# Patient Record
Sex: MALE | Race: WHITE | NOT HISPANIC OR LATINO | Employment: OTHER | URBAN - METROPOLITAN AREA
[De-identification: names, ages, dates, MRNs, and addresses within clinical notes are randomized per-mention and may not be internally consistent; named-entity substitution may affect disease eponyms.]

---

## 2022-01-18 ENCOUNTER — ESTABLISHED COMPREHENSIVE EXAM (OUTPATIENT)
Dept: URBAN - METROPOLITAN AREA CLINIC 76 | Facility: CLINIC | Age: 67
End: 2022-01-18

## 2022-01-18 DIAGNOSIS — H00.013: ICD-10-CM

## 2022-01-18 DIAGNOSIS — H43.393: ICD-10-CM

## 2022-01-18 DIAGNOSIS — E11.9: ICD-10-CM

## 2022-01-18 DIAGNOSIS — H25.813: ICD-10-CM

## 2022-01-18 LAB
LEFT EYE DIABETIC RETINOPATHY: NORMAL
RIGHT EYE DIABETIC RETINOPATHY: NORMAL

## 2022-01-18 PROCEDURE — 92014 COMPRE OPH EXAM EST PT 1/>: CPT

## 2022-01-18 PROCEDURE — 92202 OPSCPY EXTND ON/MAC DRAW: CPT

## 2022-01-18 ASSESSMENT — KERATOMETRY
OD_AXISANGLE2_DEGREES: 132
OD_K1POWER_DIOPTERS: 44.25
OS_K1POWER_DIOPTERS: 43.50
OD_AXISANGLE_DEGREES: 42
OD_K2POWER_DIOPTERS: 44.50
OS_AXISANGLE_DEGREES: 66
OS_AXISANGLE2_DEGREES: 156
OS_K2POWER_DIOPTERS: 44.50

## 2022-01-18 ASSESSMENT — TONOMETRY
OS_IOP_MMHG: 14
OD_IOP_MMHG: 14

## 2022-01-18 ASSESSMENT — VISUAL ACUITY
OD_SC: 20/25
OU_SC: 20/20
OS_SC: 20/20

## 2024-01-30 LAB — HBA1C MFR BLD HPLC: 5.8 %

## 2024-07-22 ENCOUNTER — OFFICE VISIT (OUTPATIENT)
Dept: FAMILY MEDICINE CLINIC | Facility: CLINIC | Age: 69
End: 2024-07-22
Payer: COMMERCIAL

## 2024-07-22 VITALS
TEMPERATURE: 98.5 F | HEIGHT: 69 IN | SYSTOLIC BLOOD PRESSURE: 134 MMHG | DIASTOLIC BLOOD PRESSURE: 82 MMHG | WEIGHT: 184 LBS | BODY MASS INDEX: 27.25 KG/M2 | OXYGEN SATURATION: 94 % | HEART RATE: 89 BPM

## 2024-07-22 DIAGNOSIS — Z79.899 MEDICATION MANAGEMENT: ICD-10-CM

## 2024-07-22 DIAGNOSIS — E11.9 TYPE 2 DIABETES MELLITUS WITHOUT COMPLICATION, WITHOUT LONG-TERM CURRENT USE OF INSULIN (HCC): ICD-10-CM

## 2024-07-22 DIAGNOSIS — Z12.11 SCREEN FOR COLON CANCER: ICD-10-CM

## 2024-07-22 DIAGNOSIS — Z76.89 ENCOUNTER TO ESTABLISH CARE WITH NEW DOCTOR: Primary | ICD-10-CM

## 2024-07-22 DIAGNOSIS — Z79.899 ENCOUNTER FOR LONG-TERM (CURRENT) USE OF MEDICATIONS: ICD-10-CM

## 2024-07-22 PROCEDURE — 99204 OFFICE O/P NEW MOD 45 MIN: CPT | Performed by: FAMILY MEDICINE

## 2024-07-22 RX ORDER — PANTOPRAZOLE SODIUM 40 MG/1
40 TABLET, DELAYED RELEASE ORAL DAILY
COMMUNITY
Start: 2024-07-05

## 2024-07-22 RX ORDER — ATORVASTATIN CALCIUM 10 MG/1
10 TABLET, FILM COATED ORAL
COMMUNITY
Start: 2024-07-07

## 2024-07-22 RX ORDER — LISINOPRIL 5 MG/1
5 TABLET ORAL DAILY
COMMUNITY
Start: 2024-07-10

## 2024-07-22 RX ORDER — METOPROLOL SUCCINATE 25 MG/1
25 TABLET, EXTENDED RELEASE ORAL DAILY
COMMUNITY
Start: 2024-05-02

## 2024-07-22 RX ORDER — DAPAGLIFLOZIN 5 MG/1
5 TABLET, FILM COATED ORAL DAILY
COMMUNITY
Start: 2024-07-08

## 2024-07-22 RX ORDER — BUPRENORPHINE HYDROCHLORIDE AND NALOXONE HYDROCHLORIDE DIHYDRATE 8; 2 MG/1; MG/1
1 TABLET SUBLINGUAL 2 TIMES DAILY
COMMUNITY

## 2024-07-22 NOTE — ASSESSMENT & PLAN NOTE
"  No results found for: \"HGBA1C\"    Orders:    Albumin / creatinine urine ratio; Future    Comprehensive metabolic panel; Future    Hemoglobin A1C; Future    "

## 2024-07-22 NOTE — PROGRESS NOTES
"Ambulatory Visit  Name: Bill Alamo      : 1955      MRN: [id number]  Encounter Provider: MO Borrego DO  Encounter Date: 2024   Encounter department: [facility] [place]    Assessment & Plan  Encounter to establish care with new doctor         Encounter for long-term (current) use of medications         Screen for colon cancer         Medication management         Type 2 diabetes mellitus without complication, without long-term current use of insulin (HCC)    No results found for: \"HGBA1C\"    Orders:  •  Albumin / creatinine urine ratio; Future  •  Comprehensive metabolic panel; Future  •  Hemoglobin A1C; Future    1. Encounter to establish care with new doctor  Comments:  Previously seeing  in [place]--0see labs on   2. Encounter for long-term (current) use of medications  Comments:  All medications reviewed for safety efficacy and tolerance  3. Screen for colon cancer  Comments:  Never had colonoscopy--ordered today  4. Medication management  Comments:  All medications reviewed for safety efficacy and tolerance  5. Type 2 diabetes mellitus without complication, without long-term current use of insulin (HCC)  Comments:  Last A1c 6 months ago 5.8  Repeat prior to next visit in 3 months  Orders:  -     Albumin / creatinine urine ratio; Future; Expected date: 10/22/2024  -     Comprehensive metabolic panel; Future; Expected date: 10/22/2024  -     Hemoglobin A1C; Future; Expected date: 10/22/2024      Depression Screening and Follow-up Plan: Patient was screened for depression during today's encounter. They screened negative with a PHQ-2 score of 1.      History of Present Illness     History of Present Illness  The patient is a 69-year-old male who is here to establish a new patient visit and I am glad to see him. He comes referred from Mr. Juliano Herrera, a long-time patient of mine and his wife do.    The patient's diabetes is well-managed with metformin 1000 mg twice daily and " Farxiga 5 mg. He was diagnosed with diabetes on 08/30/2018, with an initial A1c level of 10.6. Subsequently, he was diagnosed with diabetes and hepatitis on 08/30/2018. Since initiating medication, his A1c has consistently remained under 6. He underwent a digital prostate examination on 01/03/2023, which yielded normal results. He has never undergone a colonoscopy.    The patient experiences rashes on the posterior aspect of his legs, which he initially attributed to stasis ulcers. The rash was previously associated with pruritus, but this has since resolved.  • He was a  for a [place] firm for many years. He retired at 63. He moved to [place] from [place] to help his son move. He  in 2015. He has 2 sons. He has never smoked. He drinks alcohol occasionally. He has never used marijuana. He was addicted to opioids. He used his own resources to get when he needed. He used the Suboxone doctor for a while. He moved here from the clinic in [facility] where he is at. He is not on methadone. He is on buprenorphine. He sometimes forgets to take it. He did have hepatitis C that was resolved in 04/2019 after taking Mavyret 1 pill a day for 8 weeks.     Review of Systems   Constitutional:  Negative for chills and fever.   HENT:  Negative for ear pain and sore throat.    Eyes:  Negative for pain and visual disturbance.   Respiratory:  Negative for cough and shortness of breath.    Cardiovascular:  Negative for chest pain and palpitations.   Gastrointestinal:  Negative for abdominal pain and vomiting.   Endocrine:        Dx with DM in Aug, 2018:  initial A1c was 10.6  - this was the 1st one ever.    Genitourinary:  Negative for dysuria and hematuria.        Last EVONNE was Jan 30 by Dr. Lay in [place], [place]   Musculoskeletal:  Negative for arthralgias and back pain.   Skin:  Negative for color change and rash.   Neurological:  Negative for seizures and syncope.   All other systems  "reviewed and are negative.    Objective     /82 (BP Location: Left arm, Patient Position: Sitting, Cuff Size: Standard)   Pulse 89   Temp 98.5 °F (36.9 °C) (Temporal)   Ht 5' 9\" (1.753 m)   Wt 83.5 kg (184 lb)   SpO2 94%   BMI 27.17 kg/m²     Physical Exam    Physical Exam  Vitals and nursing note reviewed.   Constitutional:       General: He is not in acute distress.     Appearance: Normal appearance. He is well-developed. He is not ill-appearing, toxic-appearing or diaphoretic.   HENT:      Head: Normocephalic and atraumatic.      Nose: Nose normal.      Mouth/Throat:      Mouth: Mucous membranes are moist.   Eyes:      Extraocular Movements: Extraocular movements intact.      Conjunctiva/sclera: Conjunctivae normal.      Pupils: Pupils are equal, round, and reactive to light.   Cardiovascular:      Rate and Rhythm: Normal rate and regular rhythm.      Pulses: Normal pulses.      Heart sounds: Normal heart sounds. No murmur heard.     No friction rub.   Pulmonary:      Effort: Pulmonary effort is normal. No respiratory distress.      Breath sounds: Normal breath sounds. No stridor. No wheezing or rhonchi.   Abdominal:      Palpations: Abdomen is soft.      Tenderness: There is no abdominal tenderness.   Genitourinary:     Comments: EVONNE done by Dr. Lay on Jan 31, 2024,     Impotent since 2010. Couldn't tolerate the Viagra/Cialis, so just accepts it.   Musculoskeletal:         General: No swelling.      Cervical back: Neck supple.   Skin:     General: Skin is warm and dry.      Coloration: Skin is not jaundiced or pale.      Findings: No erythema or rash.   Neurological:      General: No focal deficit present.      Mental Status: He is alert and oriented to person, place, and time. Mental status is at baseline.   Psychiatric:         Mood and Affect: Mood normal.         Behavior: Behavior normal.         Thought Content: Thought content normal.         Judgment: Judgment normal.       "

## 2024-08-13 DIAGNOSIS — Z79.899 ENCOUNTER FOR LONG-TERM (CURRENT) USE OF MEDICATIONS: Primary | ICD-10-CM

## 2024-08-13 RX ORDER — METOPROLOL SUCCINATE 25 MG/1
25 TABLET, EXTENDED RELEASE ORAL DAILY
Qty: 90 TABLET | Refills: 3 | Status: SHIPPED | OUTPATIENT
Start: 2024-08-13

## 2024-08-13 NOTE — TELEPHONE ENCOUNTER
Reason for call:   [x] Refill   [] Prior Auth  [] Other:     Office:   [x] PCP/Provider - [facility]   [] Specialty/Provider -     Medication: metoprolol succinate (TOPROL-XL) 25 mg 24 hr tablet     Dose/Frequency: 25 mg, Daily     Quantity: 100    Pharmacy: [id number]    Does the patient have enough for 3 days?   [] Yes   [x] No - Send as HP to POD

## 2024-10-07 DIAGNOSIS — E78.2 MIXED HYPERLIPIDEMIA: ICD-10-CM

## 2024-10-07 DIAGNOSIS — E11.9 TYPE 2 DIABETES MELLITUS WITHOUT COMPLICATION, WITHOUT LONG-TERM CURRENT USE OF INSULIN (HCC): Primary | ICD-10-CM

## 2024-10-08 RX ORDER — ATORVASTATIN CALCIUM 10 MG/1
10 TABLET, FILM COATED ORAL
Qty: 100 TABLET | Refills: 1 | Status: SHIPPED | OUTPATIENT
Start: 2024-10-08

## 2024-10-11 NOTE — TELEPHONE ENCOUNTER
Pharmacy states that they did not receive refill.    Reason for call:   [x] Refill   [] Prior Auth  [] Other:     Office:   [x] PCP/Provider - PRIMARY CARE [facility] POD  Authorized By: MO Borrego DO  [] Specialty/Provider -     Medication: metFORMIN (GLUCOPHAGE) 1000 MG tablet     Dose/Frequency: Take 1 tablet (1,000 mg total) by mouth 2 (two) times a day with meals     Quantity:  100 tablet     Pharmacy: [facility]/pharmacy #[id number] [facility] [address]     Does the patient have enough for 3 days?   [x] Yes   [] No - Send as HP to POD

## 2024-11-04 DIAGNOSIS — I10 PRIMARY HYPERTENSION: Primary | ICD-10-CM

## 2024-11-04 RX ORDER — LISINOPRIL 5 MG/1
5 TABLET ORAL DAILY
Qty: 90 TABLET | Refills: 1 | Status: SHIPPED | OUTPATIENT
Start: 2024-11-04

## 2024-11-04 NOTE — TELEPHONE ENCOUNTER
Reason for call:   [x] Refill   [] Prior Auth  [] Other:     Office:   [x] PCP/Provider - MO Borrego DO   [] Specialty/Provider -     Medication:     lisinopril (ZESTRIL) 5 mg tablet 5 mg, Daily       Pharmacy: [facility]/pharmacy #[id number] [address]      Does the patient have enough for 3 days?   [] Yes   [x] No - Send as HP to POD

## 2024-11-20 ENCOUNTER — APPOINTMENT (OUTPATIENT)
Dept: LAB | Facility: HOSPITAL | Age: 69
End: 2024-11-20
Payer: COMMERCIAL

## 2024-11-20 DIAGNOSIS — E11.9 TYPE 2 DIABETES MELLITUS WITHOUT COMPLICATION, WITHOUT LONG-TERM CURRENT USE OF INSULIN (HCC): ICD-10-CM

## 2024-11-20 LAB
ALBUMIN SERPL BCG-MCNC: 4.5 G/DL (ref 3.5–5)
ALP SERPL-CCNC: 44 U/L (ref 34–104)
ALT SERPL W P-5'-P-CCNC: 17 U/L (ref 7–52)
ANION GAP SERPL CALCULATED.3IONS-SCNC: 8 MMOL/L (ref 4–13)
AST SERPL W P-5'-P-CCNC: 18 U/L (ref 13–39)
BILIRUB SERPL-MCNC: 0.51 MG/DL (ref 0.2–1)
BUN SERPL-MCNC: 19 MG/DL (ref 5–25)
CALCIUM SERPL-MCNC: 9.5 MG/DL (ref 8.4–10.2)
CHLORIDE SERPL-SCNC: 99 MMOL/L (ref 96–108)
CO2 SERPL-SCNC: 28 MMOL/L (ref 21–32)
CREAT SERPL-MCNC: 0.83 MG/DL (ref 0.6–1.3)
CREAT UR-MCNC: 59.2 MG/DL
EST. AVERAGE GLUCOSE BLD GHB EST-MCNC: 131 MG/DL
GFR SERPL CREATININE-BSD FRML MDRD: 90 ML/MIN/1.73SQ M
GLUCOSE P FAST SERPL-MCNC: 121 MG/DL (ref 65–99)
HBA1C MFR BLD: 6.2 %
MICROALBUMIN UR-MCNC: <7 MG/L
POTASSIUM SERPL-SCNC: 3.7 MMOL/L (ref 3.5–5.3)
PROT SERPL-MCNC: 7.2 G/DL (ref 6.4–8.4)
SODIUM SERPL-SCNC: 135 MMOL/L (ref 135–147)

## 2024-11-20 PROCEDURE — 83036 HEMOGLOBIN GLYCOSYLATED A1C: CPT

## 2024-11-20 PROCEDURE — 82570 ASSAY OF URINE CREATININE: CPT

## 2024-11-20 PROCEDURE — 82043 UR ALBUMIN QUANTITATIVE: CPT

## 2024-11-20 PROCEDURE — 36415 COLL VENOUS BLD VENIPUNCTURE: CPT

## 2024-11-20 PROCEDURE — 80053 COMPREHEN METABOLIC PANEL: CPT

## 2024-11-21 ENCOUNTER — OFFICE VISIT (OUTPATIENT)
Dept: FAMILY MEDICINE CLINIC | Facility: CLINIC | Age: 69
End: 2024-11-21
Payer: COMMERCIAL

## 2024-11-21 VITALS
TEMPERATURE: 98.3 F | SYSTOLIC BLOOD PRESSURE: 132 MMHG | HEART RATE: 81 BPM | WEIGHT: 187.2 LBS | OXYGEN SATURATION: 95 % | DIASTOLIC BLOOD PRESSURE: 78 MMHG | BODY MASS INDEX: 27.73 KG/M2 | HEIGHT: 69 IN

## 2024-11-21 DIAGNOSIS — E78.2 MIXED HYPERLIPIDEMIA: ICD-10-CM

## 2024-11-21 DIAGNOSIS — L30.9 DERMATITIS: ICD-10-CM

## 2024-11-21 DIAGNOSIS — Z79.899 MEDICATION MANAGEMENT: ICD-10-CM

## 2024-11-21 DIAGNOSIS — Z12.11 SCREEN FOR COLON CANCER: ICD-10-CM

## 2024-11-21 DIAGNOSIS — E53.8 VITAMIN B12 DEFICIENCY: ICD-10-CM

## 2024-11-21 DIAGNOSIS — Z11.59 NEED FOR HEPATITIS C SCREENING TEST: ICD-10-CM

## 2024-11-21 DIAGNOSIS — Z71.2 ENCOUNTER TO DISCUSS TEST RESULTS: ICD-10-CM

## 2024-11-21 DIAGNOSIS — Z79.899 ENCOUNTER FOR LONG-TERM (CURRENT) USE OF MEDICATIONS: ICD-10-CM

## 2024-11-21 DIAGNOSIS — E11.9 TYPE 2 DIABETES MELLITUS WITHOUT COMPLICATION, WITHOUT LONG-TERM CURRENT USE OF INSULIN (HCC): ICD-10-CM

## 2024-11-21 DIAGNOSIS — I10 PRIMARY HYPERTENSION: Primary | ICD-10-CM

## 2024-11-21 DIAGNOSIS — Z00.00 ENCOUNTER FOR MEDICARE ANNUAL WELLNESS EXAM: ICD-10-CM

## 2024-11-21 DIAGNOSIS — E55.9 VITAMIN D DEFICIENCY: ICD-10-CM

## 2024-11-21 PROCEDURE — G0438 PPPS, INITIAL VISIT: HCPCS | Performed by: FAMILY MEDICINE

## 2024-11-21 PROCEDURE — 99214 OFFICE O/P EST MOD 30 MIN: CPT | Performed by: FAMILY MEDICINE

## 2024-11-21 RX ORDER — TRIAMCINOLONE ACETONIDE 1 MG/G
CREAM TOPICAL 2 TIMES DAILY
Qty: 30 G | Refills: 0 | Status: SHIPPED | OUTPATIENT
Start: 2024-11-21

## 2024-11-21 NOTE — PROGRESS NOTES
Name: Bill Alamo      : 1955      MRN: [id number]  Encounter Provider: MO Borrego DO  Encounter Date: 2024   Encounter department: [facility]    Assessment & Plan  Type 2 diabetes mellitus without complication, without long-term current use of insulin (HCC)    Lab Results   Component Value Date    HGBA1C 6.2 (H) 2024       Orders:    IRIS Diabetic eye exam    Albumin / creatinine urine ratio; Future    Comprehensive metabolic panel; Future    Hemoglobin A1C; Future    CBC and differential; Future    TSH, 3rd generation with Free T4 reflex; Future    Lipid Panel with Direct LDL reflex; Future    UA w Reflex to Microscopic w Reflex to Culture; Future    Primary hypertension    Orders:    Comprehensive metabolic panel; Future    Mixed hyperlipidemia    Orders:    Lipid Panel with Direct LDL reflex; Future    Encounter for long-term (current) use of medications    Orders:    Albumin / creatinine urine ratio; Future    Comprehensive metabolic panel; Future    Hemoglobin A1C; Future    CBC and differential; Future    TSH, 3rd generation with Free T4 reflex; Future    Lipid Panel with Direct LDL reflex; Future    Medication management         Encounter to discuss test results         Dermatitis         Vitamin B12 deficiency    Orders:    Vitamin B12; Future    Vitamin D deficiency    Orders:    Vitamin D 25 hydroxy; Future    Need for hepatitis C screening test    Orders:    Hepatitis C Antibody; Future    Screen for colon cancer    Orders:    Cologuard       Preventive health issues were discussed with patient, and age appropriate screening tests were ordered as noted in patient's After Visit Summary. Personalized health advice and appropriate referrals for health education or preventive services given if needed, as noted in patient's After Visit Summary.    History of Present Illness     Rash       Patient Care Team:  MO Borrego DO as PCP - General (Family  Medicine)    Review of Systems   Skin:  Positive for rash.     Medical History Reviewed by provider this encounter:       Annual Wellness Visit Questionnaire   Bill is here for his Initial Wellness visit.     Health Risk Assessment:   Patient rates overall health as very good. Patient feels that their physical health rating is slightly worse. Patient is satisfied with their life. Eyesight was rated as same. Hearing was rated as same. Patient feels that their emotional and mental health rating is same. Patients states they are never, rarely angry. Patient states they are sometimes unusually tired/fatigued. Pain experienced in the last 7 days has been none. Patient states that he has experienced no weight loss or gain in last 6 months. R ankle achilles tendon -- intermittent pain     Depression Screening:   PHQ-2 Score: 0      Fall Risk Screening:   In the past year, patient has experienced: no history of falling in past year      Home Safety:  Patient does not have trouble with stairs inside or outside of their home. Patient has working smoke alarms and has working carbon monoxide detector. Home safety hazards include: none.     Nutrition:   Current diet is Regular, Limited junk food and Other (please comment). Pt reports very good protein intake. Limited vegetables but does enjoy fruits. Too many carbs     Medications:   Patient is currently taking over-the-counter supplements. OTC medications include: see medication list. Patient is able to manage medications.     Activities of Daily Living (ADLs)/Instrumental Activities of Daily Living (IADLs):   Walk and transfer into and out of bed and chair?: Yes  Dress and groom yourself?: Yes    Bathe or shower yourself?: Yes    Feed yourself? Yes  Do your laundry/housekeeping?: Yes  Manage your money, pay your bills and track your expenses?: Yes  Make your own meals?: Yes    Do your own shopping?: Yes    Previous Hospitalizations:   Any hospitalizations or ED visits within  the last 12 months?: No      Advance Care Planning:   Living will: No    Durable POA for healthcare: No    Advanced directive: No    Advanced directive counseling given: Yes    ACP document given: Yes    Patient declined ACP directive: No    End of Life Decisions reviewed with patient: Yes    Provider agrees with end of life decisions: Yes      Cognitive Screening:   Provider or family/friend/caregiver concerned regarding cognition?: No    PREVENTIVE SCREENINGS      Cardiovascular Screening:    General: Screening Not Indicated, History Lipid Disorder and Risks and Benefits Discussed    Due for: Lipid Panel      Diabetes Screening:     General: Screening Not Indicated, History Diabetes and Risks and Benefits Discussed      Colorectal Cancer Screening:     General: Risks and Benefits Discussed    Due for: Cologuard      Prostate Cancer Screening:    General: Risks and Benefits Discussed    Due for: PSA      Abdominal Aortic Aneurysm (AAA) Screening:    Risk factors include: age between 65-74 yo        General: Risks and Benefits Discussed    Due for: Screening AAA Ultrasound      Lung Cancer Screening:     General: Screening Not Indicated and Risks and Benefits Discussed      Hepatitis C Screening:    General: Screening Not Indicated, History Hepatitis C, Risks and Benefits Discussed and Screening Current    Hep C Screening Accepted: No     Screening, Brief Intervention, and Referral to Treatment (SBIRT)    Screening  Typical number of drinks in a day: 0  Typical number of drinks in a week: 0  Interpretation: Low risk drinking behavior.    Single Item Drug Screening:  How often have you used an illegal drug (including marijuana) or a prescription medication for non-medical reasons in the past year? never    Single Item Drug Screen Score: 0  Interpretation: Negative screen for possible drug use disorder    Brief Intervention  Alcohol & drug use screenings were reviewed. No concerns regarding substance use disorder  identified. Healthy alcohol use/limits discussed.     Review of Current Opioid Use  Opioid Risk Tool (ORT) Score: 0  Opioid Risk Tool (ORT) Interpretation: Score 0-3: Low risk for opioid misuse    Other Counseling Topics:   Car/seat belt/driving safety, skin self-exam, sunscreen and calcium and vitamin D intake and regular weightbearing exercise.        No results found.    Objective   There were no vitals taken for this visit.    Physical Exam  Administrative Statements   I have spent a total time of 37 minutes in caring for this patient on the day of the visit/encounter including Diagnostic results, Prognosis, Risks and benefits of tx options, Instructions for management, Patient and family education, Importance of tx compliance, Risk factor reductions, Impressions, Counseling / Coordination of care, Documenting in the medical record, and Reviewing / ordering tests, medicine, procedures  . Topics discussed with the patient / family include symptom assessment and management, medication review, medication adjustment, psychosocial support, and advanced directives.

## 2024-11-21 NOTE — ASSESSMENT & PLAN NOTE
Lab Results   Component Value Date    HGBA1C 6.2 (H) 11/20/2024       Orders:    IRIS Diabetic eye exam    Albumin / creatinine urine ratio; Future    Comprehensive metabolic panel; Future    Hemoglobin A1C; Future    CBC and differential; Future    TSH, 3rd generation with Free T4 reflex; Future    Lipid Panel with Direct LDL reflex; Future    UA w Reflex to Microscopic w Reflex to Culture; Future

## 2024-11-21 NOTE — PROGRESS NOTES
Name: Bill Alamo      : 1955      MRN: [id number]  Encounter Provider: MO Borrego DO  Encounter Date: 2024   Encounter department: [facility]    Assessment & Plan  Type 2 diabetes mellitus without complication, without long-term current use of insulin (HCC)    Lab Results   Component Value Date    HGBA1C 6.2 (H) 2024       Orders:    IRIS Diabetic eye exam    Albumin / creatinine urine ratio; Future    Comprehensive metabolic panel; Future    Hemoglobin A1C; Future    CBC and differential; Future    TSH, 3rd generation with Free T4 reflex; Future    Lipid Panel with Direct LDL reflex; Future    UA w Reflex to Microscopic w Reflex to Culture; Future    Primary hypertension  Blood pressure controlled at 132/78 taking metoprolol succinate 25 mg OD and lisinopril 5 mg OD  Orders:    Comprehensive metabolic panel; Future    Mixed hyperlipidemia  Controlled with Lipitor 10 mg once  Orders:    Lipid Panel with Direct LDL reflex; Future    Encounter for long-term (current) use of medications  All medications reviewed for safety efficacy tolerance  Orders:    Albumin / creatinine urine ratio; Future    Comprehensive metabolic panel; Future    Hemoglobin A1C; Future    CBC and differential; Future    TSH, 3rd generation with Free T4 reflex; Future    Lipid Panel with Direct LDL reflex; Future    Medication management  All medications reviewed with patient and no changes necessary.  He is taking Protonix 40 mg about every 3 or 4 days to stretch his IT       Encounter to discuss test results  Labs done yesterday approximate now I cannot remember about all that say at some point which you only want to do vitamin D levels as well as surfing but I could do that the next time I see if I       Dermatitis  Only on the R LE, pre-tibial area.  This displays cobblestoning of yellow-tan and lesions that are occasionally pruritic.  Present about 1 month.  No other places on body will try  triamcinolone cream he had a history  Orders:    triamcinolone (KENALOG) 0.1 % cream; Apply topically 2 (two) times a day    Vitamin B12 deficiency  Taking metformin on regular basis 2000 mg/day B12 level  Orders:    Vitamin B12; Future    Vitamin D deficiency  Will check vitamin D level  Orders:    Vitamin D 25 hydroxy; Future    Need for hepatitis C screening test  Will ck with next lab  Orders:    Hepatitis C Antibody; Future    Screen for colon cancer    Orders:    Cologuard    Encounter for Medicare annual wellness exam  I was able to complete this as an add-on at the end of this visit.            History of Present Illness     History of Present Illness  The patient is a 69-year-old male who presents for evaluation of multiple medical concerns.    He is currently on a regimen of metformin, Farxiga, lisinopril, metoprolol, and Protonix, which he takes every 3 to 4 days. His fasting blood sugar levels are typically around 118, occasionally reaching 124 or 123. He maintains a daily exercise routine but admits to not adhering to a strict diet. He has noticed a decrease in his GFR by 10 points and consumes a significant amount of coffee. He has expressed concern about his low albumin levels. He has been experiencing weight gain and acknowledges the need for weight loss. He has never experienced any heart-related issues such as tightness, heaviness, or pressure. His sleep patterns are normal.    He has been dealing with a rash on his leg for approximately 5 weeks. The rash initially resembled ringworm but has since changed in appearance. It is not itchy and is localized to one side of his leg. He has expressed concern about the possibility of it being lymphoma.    He experiences muscle cramps in his arms and calves, which occur without any apparent trigger. These cramps can occur at any time of the day.    He was previously on vitamin D supplements but has since discontinued them.    He has expressed interest in  "undergoing Cologuard testing for colon cancer screening.    He had hepatitis C which was resolved in 2019. He was on Mavyret for 8 weeks.    SOCIAL HISTORY  He never smoked. He does not drink alcohol.    FAMILY HISTORY  His father  from alcoholism.     Review of Systems  Objective   /78 (BP Location: Left arm, Patient Position: Sitting, Cuff Size: Standard)   Pulse 81   Temp 98.3 °F (36.8 °C) (Temporal)   Ht 5' 9\" (1.753 m)   Wt 84.9 kg (187 lb 3.2 oz)   SpO2 95%   BMI 27.64 kg/m²     Physical Exam  Vital Signs  Blood pressure is controlled at 132/78.  Physical Exam  Cardiovascular:      Pulses: no weak pulses.           Dorsalis pedis pulses are 2+ on the right side and 2+ on the left side.        Posterior tibial pulses are 2+ on the right side and 2+ on the left side.   Feet:      Right foot:      Skin integrity: Warmth present. No ulcer, skin breakdown, erythema, callus or dry skin.      Left foot:      Skin integrity: Warmth present. No ulcer, skin breakdown, erythema, callus or dry skin.       Administrative Statements   I have spent a total time of 35 minutes in caring for this patient on the day of the visit/encounter including Diagnostic results, Prognosis, Risks and benefits of tx options, Instructions for management, Patient and family education, Importance of tx compliance, Risk factor reductions, Impressions, Counseling / Coordination of care, Documenting in the medical record, Reviewing / ordering tests, medicine, procedures  , and Obtaining or reviewing history  . Topics discussed with the patient / family include symptom assessment and management, medication review, medication adjustment, and psychosocial support.Diabetic Foot Exam    Patient's shoes and socks removed.    Right Foot/Ankle   Right Foot Inspection  Skin Exam: skin normal, skin intact and warmth. No dry skin, no callus, no erythema, no maceration, no abnormal color, no pre-ulcer, no ulcer and no callus.     Toe Exam: " ROM and strength within normal limits.     Sensory   Vibration: intact  Proprioception: intact  Monofilament testing: intact    Vascular  Capillary refills: < 3 seconds  The right DP pulse is 2+. The right PT pulse is 2+.     Left Foot/Ankle  Left Foot Inspection  Skin Exam: skin normal, skin intact and warmth. No dry skin, no erythema, no maceration, normal color, no pre-ulcer, no ulcer and no callus.     Toe Exam: ROM and strength within normal limits.     Sensory   Vibration: intact  Proprioception: intact  Monofilament testing: intact    Vascular  Capillary refills: < 3 seconds  The left DP pulse is 2+. The left PT pulse is 2+.     Assign Risk Category  No deformity present  No loss of protective sensation  No weak pulses  Risk: 0

## 2024-11-22 ENCOUNTER — TELEPHONE (OUTPATIENT)
Dept: ADMINISTRATIVE | Facility: OTHER | Age: 69
End: 2024-11-22

## 2024-11-22 NOTE — LETTER
Diabetic Eye Exam Form    Date Requested: 24  Patient: Bill Alamo  Patient : 1955   Referring Provider: MO Borrego DO      DIABETIC Eye Exam Date ____2-27-3002___________________________      Type of Exam MUST be documented for Diabetic Eye Exams. Please CHECK ONE.     Retinal Exam       Dilated Retinal Exam       OCT       Optomap-Iris Exam      Fundus Photography       Left Eye - Please check Retinopathy or No Retinopathy        Exam did show retinopathy    Exam did not show retinopathy       Right Eye - Please check Retinopathy or No Retinopathy       Exam did show retinopathy    Exam did not show retinopathy       Comments __________________________________________________________    Practice Providing Exam ______________________________________________    Exam Performed By (print name) _______________________________________      Provider Signature ___________________________________________________      These reports are needed for  compliance.  Please fax this completed form and a copy of the Diabetic Eye Exam report to our office located at [address] [place] [address] as soon as possible via Fax [phone] esthela Vivas: Phone [phone]  We thank you for your assistance in treating our mutual patient.

## 2024-11-22 NOTE — TELEPHONE ENCOUNTER
----- Message from Lucien KAPLAN sent at 11/21/2024  1:05 PM EST -----  Regarding: DM EYE  11/21/24 1:05 PM    Hello, our patient attached above has had Diabetic Eye Exam completed/performed. Please assist in updating the patient chart by making an External outreach to Dr. Eladia Pennington facility ([facility]) located in [facility]. The date of service is January 2023.    Thank you,  Lucien Hargrove MA   PRIMARY CARE [place]

## 2024-11-22 NOTE — TELEPHONE ENCOUNTER
Upon review of the In Basket request we were able to locate, review, and update the patient chart as requested for Diabetic Eye Exam.    Any additional questions or concerns should be emailed to the Practice Liaisons via the appropriate education email address, please do not reply via In Basket.    Thank you  Sangeetha Alonzo MA   [facility]

## 2024-11-22 NOTE — TELEPHONE ENCOUNTER
Upon review of the In Basket request and the patient's chart, initial outreach has been made via fax to facility. Please see Contacts section for details.     Thank you  Sangeetha Alonzo MA

## 2024-12-14 LAB — COLOGUARD RESULT REPORTABLE: POSITIVE

## 2024-12-16 ENCOUNTER — TELEPHONE (OUTPATIENT)
Age: 69
End: 2024-12-16

## 2024-12-16 ENCOUNTER — RESULTS FOLLOW-UP (OUTPATIENT)
Dept: FAMILY MEDICINE CLINIC | Facility: CLINIC | Age: 69
End: 2024-12-16

## 2024-12-16 DIAGNOSIS — R19.5 POSITIVE COLORECTAL CANCER SCREENING USING COLOGUARD TEST: Primary | ICD-10-CM

## 2024-12-16 NOTE — TELEPHONE ENCOUNTER
12/16/24  Screened by: Saira Madrigal    Referring Provider DR FREITAS    Pre- Screening:     There is no height or weight on file to calculate BMI.  BMI 27.64  Has patient been referred for a routine screening Colonoscopy? yes  Is the patient between 45-75 years old? yes      Previous Colonoscopy no   If yes:    Date:     Facility:     Reason:     Does the patient want to see a Gastroenterologist prior to their procedure OR are they having any GI symptoms? no    Has the patient been hospitalized or had abdominal surgery in the past 6 months? no    Does the patient use supplemental oxygen? no    Does the patient take Coumadin, Lovenox, Plavix, Elliquis, Xarelto, or other blood thinning medication? no    Has the patient had a stroke, cardiac event, or stent placed in the past year? no    If patient is between 45yrs - 49yrs, please advise patient that we will have to confirm benefits & coverage with their insurance company for a routine screening colonoscopy.

## 2024-12-16 NOTE — TELEPHONE ENCOUNTER
----- Message from MO Borrego DO sent at 12/15/2024  5:33 PM EST -----  Ok, this now triggers a colonoscopy. Is pt aware? Does he know anyone? Ok to put in referral to Dr. Lilly??  ----- Message -----  From: Interface, Lab Results In  Sent: 12/14/2024  10:47 AM EST  To: MO Borrego, DO

## 2024-12-16 NOTE — TELEPHONE ENCOUNTER
Scheduled date of colonoscopy (as of today):1/30/25  Physician performing colonoscopy:DR LOJA  Location of colonoscopy:  Bowel prep reviewed with patient:DAPHNE/SUZIE SENT TO [facility]  Instructions reviewed with patient by:AGUSTÍN  Clearances: KAYLAH      DIABETIC  JARDIANCE

## 2024-12-16 NOTE — LETTER
Karl Khan,    Sona are your prep instructions for your upcoming procedure on 1/30/25. If you have any questions, please give us a call at [phone].    Thank you,    [facility]            COLONOSCOPY  MIRALAX/Dulcolax Bowel Preparation Instructions    The OR/GI Lab will contact you the evening prior to your procedure with your exact arrival time.    Our practice requires a 1 week notice for any cancellations or rescheduling. We kindly ask that you immediately notify us of any changes including any new medications that are prescribed. Thank you for your cooperation.     WEEK BEFORE YOUR PROCEDURE:  Stop taking Iron tablets.  5 days prior, AVOID vegetables and fruits with skins or seeds, nuts, corn, popcorn and whole grain breads.   Purchase: One (1) 238-gram container of Miralax (polyethylene glycol 3350), four (4) 5 mg Dulcolax (bisacodyl) tablets, and one (1) 64-ounce bottle of Gatorade (sports drink) - no red, orange, or purple. These may be purchased at any pharmacy without a prescription. Generic products are permissible.   Arrange responsible transportation for day of the procedure.     DAY BEFORE THE PROCEDURE:   CLEAR liquids only for entire day prior. Nothing red, orange or purple.    You MAY have:                                                               Soda  Water  Broth Gatorade  Jello  Popsicles Coffee/tea without milk/creamer     YOU MAY NOT HAVE:  Solid foods   Milk and milk products    Juice with pulp    BOWEL PREPARATION:  Includes: One (1) 238-gram container of Miralax (polyethylene glycol 3350), four (4) 5 mg Dulcolax (bisacodyl) tablets, and one (1) 64-ounce bottle of Gatorade (sports drink).  Preparation may be refrigerated.  Entire bowel prep should be completed.     Afternoon before the procedure (2:00 pm - 5:00 pm):    Take two (2) 5 mg Dulcolax laxative tablets.     Evening before the procedure (6:00 pm):  Mix entire container  of Miralax with one (1) 64-ounce bottle of Gatorade and shake until all medication is dissolved.   Begin drinking solution. Drink an eight (8) ounce cup every 10-15 minutes until you have consumed half (32 ounces) of the solution.  Refrigerate remaining solution.    Night before the procedure (8:00 pm):  Take two (2) 5 mg Dulcolax laxative tablets.     Beginning 5 hours before your procedure:  Drink the remaining amount of prepared solution (32 ounces).  Drink an eight (8) ounce cup every 10-15 minutes until you have consumed the remaining solution.     Bowel prep should be completed 4 hours prior to procedure time.    NOTHING TO EAT OR DRINK AFTER MIDNIGHT- EXCEPT FOR YOUR PREP    DAY OF THE PROCEDURE:  You may brush your teeth.  Leave all jewelry at home.  Please arrive for your procedure as indicated by the OR / GI Lab / Endoscopy Unit. The hospital will contact you the day before with your exact arrival time.   Make sure you have arranged ahead of time for a responsible adult (18 or older) to accompany and drive you home after the procedure.  Please discuss any transportation concerns with our staff prior to your procedure.    The effects of the anesthesia can persist for 24 hours.  After receiving the sedation, you must exercise caution before engaging in any activity that could harm yourself and others (such as driving a car).  Do not make any important decisions or do not drink any alcoholic beverages during this time period.  After your procedure, you may have anything you'd like to eat or drink.  You will probably want to start with something light.  Please include plenty of fluids.  Avoid items that cause gas such as sodas and salads.    SPECIAL INSTRUCTIONS:    For patients currently taking blood thinners and/or antiplatelet therapy our office will contact the prescribing provider.  Our office will contact you with any required changes to your medication regimen.     Blood thinner (i.e. - Coumadin,  Pradaxa, Lovenox, Xarelto, Eliquis)  ?  Continue (Do Not Stop)  ? Stop______________for_____________days prior to the procedure.    Antiplatelet (i.e. - Plavix, Aggrenox, Effient, Brilinta)  ?  Continue (Do Not Stop)  ? Stop______________for_____________days prior to the procedure.       Diabetes:   If you are Diabetic, please see separate Diabetic Instruction Sheet.          Prescribed medications:  Do not stop your aspirin, or any of your other medications (unless instructed otherwise).    Take the rest of your prescribed medications with small sips of water at least 2 hours prior to your procedure.          Medicine Instructions for Adults with Diabetes who Need a Bowel Prep       Follow these instructions when a BOWEL PREP is required for your procedure or surgery!    NOTE:   GLP-1 Agonists taken weekly: do not take in the 7 days before your procedure   SGLT-2 Inhibitors: do not take in the 4 days before your procedure     On the Day Before Surgery/Procedure  If you are having a procedure (e.g. Colonoscopy) or surgery that requires a bowel prep and you may have at least a clear liquid diet, follow the directions below based on the type of medicine you take for your diabetes.     Type of Medicine You Take Examples What to do   Pre-Mixed Insulin - Intermediate Acting Humalog® 75/25, Humulin® 70/30, Novolog® 70/30, Regular Insulin Take ½ your regular dose the evening before your procedure   Rapid/Fast Acting Insulin Humalog® U200, NovoLog®, Apidra®, Fiasp® Take ½ your regular dose the evening before your procedure.   Long-Acting Insulin Lantus®, Levemir®, Tresiba®, Toujeo®, Basaglar® Take your FULL regular dose the day before procedure   Oral Sulfonylurea Glipizide/Glimepiride/Glucotrol® Take ½ your regular dose the evening before your procedure   Other Oral Diabetes Medicines Metformin®, Glucophage®, Glucophage XR®, Riomet®, Glumetza®), Actose®, Avandia®, Glyset®, Prandin® Take your regular dose with dinner in the  evening before your procedure   GLP-1 Agonists AdlyxinÒ, ByettaÒ, BydureonÒ, OzempicÒ, SoliquaÒ, TanzeumÒ, TrulicityÒ, VictozaÒ, Saxenda®, Rybelsus® If taken daily, take as normal    If taken weekly, do not take this medicine for 7 days before your procedure including the day of the procedure (resume taking after the procedure)   SGLT-2 Inhibitors Jardiance®, Invokana®, Farxiga®,   Steglatro®, Brenzavvy®, Qtern®, Segluromet®, Glyxambi®, Synjardy®, Synjardy XR®, Invokamet®, Invokamet XR®, Trijary XR®, Xigduo XR®, Steglujan® Do not take for 4 days before your procedure including the day of the procedure (resume taking after the procedure)                More information continued on back                    Medicine Instructions for Adults with Diabetes who Need a Bowel Prep  Page 2      On the Day of Surgery/Procedure  Follow the directions below based on the type of medicine you take for your diabetes.     Type of Medicine You Take Examples What to do   Long-Acting Insulin Lantus®, Levemir®, Tresiba®, Toujeo®, Basaglar®, Semglee®   If you usually take your Long-Acting Insulin in the morning, take the full dose as scheduled.   GLP-1 Agonists AdlyxinÒ, ByettaÒ, BydureonÒ, OzempicÒ, SoliquaÒ, TanzeumÒ, TrulicityÒ, VictozaÒ, Saxenda®, Rybelsus® Do NOT take this medicine on the day of your procedure (resume taking after the procedure)       On the Day of Surgery/Procedure (continued)  Except for the morning Long-Acting Insulin, DO NOT take ANY diabetic medicine on the day of your procedure unless you were instructed by the doctor who manages your diabetes medicines.    Continue to check your blood sugars.  If you have an insulin pump, ask your endocrinologist for instructions at least 3 days before your procedure. NOTE: If you are not able to ask your endocrinologist in advance, on the day of the procedure set your insulin pump to your basal rate only. Bring your insulin pump supplies to the hospital.     If you have any  questions about taking your diabetes medicines prior to your procedure, please contact the doctor who manages your diabetes medicines.           For any questions or concerns related to your bowel preparation or pre-procedure instructions, please contact our office at [phone].  Thank you for choosing . [facility]!

## 2024-12-16 NOTE — TELEPHONE ENCOUNTER
Called and spoke w pt -- he is agreeable to CRC screening by colonoscopy and ref placed at this time. Copy of ref mailed to home address at this time.

## 2025-01-06 ENCOUNTER — TELEPHONE (OUTPATIENT)
Age: 70
End: 2025-01-06

## 2025-01-06 NOTE — TELEPHONE ENCOUNTER
Patient called in regards to the medication Farxiga 5mg. Patient states his insurance will no longer cover it and he could no longer afford it. Patient is requesting to see if there is an alternative medication similar to Farxiga in a generic form that could be order.  Please advise and contact patient.  Thank you

## 2025-01-07 DIAGNOSIS — E11.9 TYPE 2 DIABETES MELLITUS WITHOUT COMPLICATION, WITHOUT LONG-TERM CURRENT USE OF INSULIN (HCC): Primary | ICD-10-CM

## 2025-01-07 RX ORDER — GLIPIZIDE 10 MG/1
10 TABLET, FILM COATED, EXTENDED RELEASE ORAL DAILY
Qty: 30 TABLET | Refills: 3 | Status: SHIPPED | OUTPATIENT
Start: 2025-01-07

## 2025-01-07 NOTE — TELEPHONE ENCOUNTER
Ok as per PCP to replace farxiga with glipizide 10 mg OD. Medication pended and sent to PCP for approval.

## 2025-01-07 NOTE — TELEPHONE ENCOUNTER
Spoke to patient and made aware of PCP message and will send the glipizide for PCP to approve and send to pharmacy. Patient would like it sent to [facility] on [facility]

## 2025-01-30 ENCOUNTER — ANESTHESIA (OUTPATIENT)
Dept: GASTROENTEROLOGY | Facility: HOSPITAL | Age: 70
End: 2025-01-30
Payer: COMMERCIAL

## 2025-01-30 ENCOUNTER — ANESTHESIA EVENT (OUTPATIENT)
Dept: GASTROENTEROLOGY | Facility: HOSPITAL | Age: 70
End: 2025-01-30
Payer: COMMERCIAL

## 2025-01-30 ENCOUNTER — HOSPITAL ENCOUNTER (OUTPATIENT)
Dept: GASTROENTEROLOGY | Facility: HOSPITAL | Age: 70
Setting detail: OUTPATIENT SURGERY
End: 2025-01-30
Attending: INTERNAL MEDICINE
Payer: COMMERCIAL

## 2025-01-30 VITALS
TEMPERATURE: 98 F | RESPIRATION RATE: 16 BRPM | HEART RATE: 77 BPM | BODY MASS INDEX: 27.7 KG/M2 | WEIGHT: 187 LBS | HEIGHT: 69 IN | SYSTOLIC BLOOD PRESSURE: 126 MMHG | OXYGEN SATURATION: 98 % | DIASTOLIC BLOOD PRESSURE: 78 MMHG

## 2025-01-30 DIAGNOSIS — R19.5 POSITIVE COLORECTAL CANCER SCREENING USING COLOGUARD TEST: ICD-10-CM

## 2025-01-30 LAB — GLUCOSE SERPL-MCNC: 127 MG/DL (ref 65–140)

## 2025-01-30 PROCEDURE — 82948 REAGENT STRIP/BLOOD GLUCOSE: CPT

## 2025-01-30 PROCEDURE — 88305 TISSUE EXAM BY PATHOLOGIST: CPT | Performed by: PATHOLOGY

## 2025-01-30 PROCEDURE — 45380 COLONOSCOPY AND BIOPSY: CPT | Performed by: INTERNAL MEDICINE

## 2025-01-30 RX ORDER — SODIUM CHLORIDE, SODIUM LACTATE, POTASSIUM CHLORIDE, CALCIUM CHLORIDE 600; 310; 30; 20 MG/100ML; MG/100ML; MG/100ML; MG/100ML
INJECTION, SOLUTION INTRAVENOUS CONTINUOUS PRN
Status: DISCONTINUED | OUTPATIENT
Start: 2025-01-30 | End: 2025-01-30

## 2025-01-30 RX ORDER — LIDOCAINE HYDROCHLORIDE 20 MG/ML
INJECTION, SOLUTION EPIDURAL; INFILTRATION; INTRACAUDAL; PERINEURAL AS NEEDED
Status: DISCONTINUED | OUTPATIENT
Start: 2025-01-30 | End: 2025-01-30

## 2025-01-30 RX ORDER — PROPOFOL 10 MG/ML
INJECTION, EMULSION INTRAVENOUS AS NEEDED
Status: DISCONTINUED | OUTPATIENT
Start: 2025-01-30 | End: 2025-01-30

## 2025-01-30 RX ADMIN — PROPOFOL 50 MG: 10 INJECTION, EMULSION INTRAVENOUS at 08:46

## 2025-01-30 RX ADMIN — LIDOCAINE HYDROCHLORIDE 100 MG: 20 INJECTION, SOLUTION EPIDURAL; INFILTRATION; INTRACAUDAL; PERINEURAL at 08:45

## 2025-01-30 RX ADMIN — PROPOFOL 50 MG: 10 INJECTION, EMULSION INTRAVENOUS at 08:55

## 2025-01-30 RX ADMIN — SODIUM CHLORIDE, SODIUM LACTATE, POTASSIUM CHLORIDE, AND CALCIUM CHLORIDE: .6; .31; .03; .02 INJECTION, SOLUTION INTRAVENOUS at 08:30

## 2025-01-30 RX ADMIN — PROPOFOL 100 MG: 10 INJECTION, EMULSION INTRAVENOUS at 08:45

## 2025-01-30 RX ADMIN — PROPOFOL 50 MG: 10 INJECTION, EMULSION INTRAVENOUS at 09:03

## 2025-01-30 NOTE — ANESTHESIA POSTPROCEDURE EVALUATION
Post-Op Assessment Note    CV Status:  Stable  Pain Score: 0    Pain management: adequate       Mental Status:  Alert and awake   Hydration Status:  Euvolemic   PONV Controlled:  Controlled   Airway Patency:  Patent     Post Op Vitals Reviewed: Yes    No anethesia notable event occurred.    Staff: Anesthesiologist, CRNA           Last Filed PACU Vitals:  Vitals Value Taken Time   Temp 96.9 °F (36.1 °C) 01/30/25 0908   Pulse 76 01/30/25 0908   /66 01/30/25 0908   Resp 14 01/30/25 0908   SpO2 95 % 01/30/25 0908       Modified Johanna:     Vitals Value Taken Time   Activity 2 01/30/25 0908   Respiration 2 01/30/25 0908   Circulation 2 01/30/25 0908   Consciousness 1 01/30/25 0908   Oxygen Saturation 2 01/30/25 0908     Modified Johanna Score: 9

## 2025-01-30 NOTE — H&P
"History and Physical -  [facility]  Bill Alamo 69 y.o. male MRN: [id number]                  HPI: Bill Alamo is a 69 y.o. year old male who presents for positive Cologuard      REVIEW OF SYSTEMS: Per the HPI, and otherwise unremarkable.    Historical Information   Past Medical History:   Diagnosis Date    Diabetes mellitus (HCC)     DM 2 -- originally 10.6 when Dx    Erectile dysfunction     Hx of hepatitis C     Hypertension     Hypertension     Substance abuse (HCC)      History reviewed. No pertinent surgical history.  Social History   Social History     Substance and Sexual Activity   Alcohol Use Not Currently     Social History     Substance and Sexual Activity   Drug Use Not Currently    Types: Oxycodone    Comment: [id number]     Social History     Tobacco Use   Smoking Status Never   Smokeless Tobacco Never     Family History   Problem Relation Age of Onset    Coronary artery disease Mother     Alcohol abuse Father     Heart failure Father        Meds/Allergies       Current Outpatient Medications:     atorvastatin (LIPITOR) 10 mg tablet    buprenorphine-naloxone (SUBOXONE) 8-2 mg per SL tablet    lisinopril (ZESTRIL) 5 mg tablet    metFORMIN (GLUCOPHAGE) 1000 MG tablet    metoprolol succinate (TOPROL-XL) 25 mg 24 hr tablet    pantoprazole (PROTONIX) 40 mg tablet    NON FORMULARY    triamcinolone (KENALOG) 0.1 % cream    No Known Allergies    Objective     /89   Pulse 105   Temp (!) 97.4 °F (36.3 °C) (Temporal)   Resp 18   Ht 5' 9\" (1.753 m)   Wt 84.8 kg (187 lb)   SpO2 95%   BMI 27.62 kg/m²       PHYSICAL EXAM    Gen: NAD  Head: NCAT  CV: RRR  CHEST: Clear  ABD: soft, NT/ND  EXT: no edema      ASSESSMENT/PLAN:  This is a 69 y.o. year old male here for colonoscopy, and he is stable and optimized for his procedure.        "
Contact-6/29/22 MDRO [facility] SPUTUM. Allergies: erythromycin

## 2025-01-30 NOTE — ANESTHESIA PREPROCEDURE EVALUATION
Procedure:  COLONOSCOPY    Relevant Problems   ENDO   (+) Type 2 diabetes mellitus without complication, without long-term current use of insulin (HCC)      HTN   Hx of hepatitis C     Physical Exam    Airway    Mallampati score: II  TM Distance: >3 FB  Neck ROM: full     Dental   Comment: Eroded teeth; multiple missing; none loose     Cardiovascular      Pulmonary      Other Findings        Anesthesia Plan  ASA Score- 2     Anesthesia Type- IV sedation with anesthesia with ASA Monitors.         Additional Monitors:     Airway Plan:            Plan Factors-Exercise tolerance (METS): >4 METS.    Chart reviewed.    Patient summary reviewed.    Patient is not a current smoker.      Obstructive sleep apnea risk education given perioperatively.        Induction- intravenous.    Postoperative Plan-         Informed Consent- Anesthetic plan and risks discussed with patient.  I personally reviewed this patient with the CRNA. Discussed and agreed on the Anesthesia Plan with the CRNA..      NPO Status:  Vitals Value Taken Time   Date of last liquid 01/30/25 01/30/25 0749   Time of last liquid 0300 01/30/25 0749   Date of last solid 01/28/25 01/30/25 0749   Time of last solid 2100 01/30/25 0749

## 2025-02-06 PROCEDURE — 88305 TISSUE EXAM BY PATHOLOGIST: CPT | Performed by: PATHOLOGY

## 2025-02-11 ENCOUNTER — RESULTS FOLLOW-UP (OUTPATIENT)
Dept: GASTROENTEROLOGY | Facility: CLINIC | Age: 70
End: 2025-02-11

## 2025-03-12 ENCOUNTER — TELEPHONE (OUTPATIENT)
Dept: ADMINISTRATIVE | Facility: OTHER | Age: 70
End: 2025-03-12

## 2025-03-12 NOTE — TELEPHONE ENCOUNTER
Upon review of the In Basket request we have found/obtained the documentation. After careful review of the document we are unable to complete this request for Diabetic Eye Exam because the documentation does not have the result(s) needed to close the requested care gap(s). Both eyes must have a gradable image to use for HM purposes.    Any additional questions or concerns should be emailed to the Practice Liaisons via the appropriate education email address, please do not reply via In Basket.    Thank you  Anatoly Oconnell MA   [facility]

## 2025-03-12 NOTE — TELEPHONE ENCOUNTER
----- Message from Lucien KAPLAN sent at 3/11/2025  4:12 PM EDT -----  Regarding: DM EYE --UPDATE  03/11/25 4:12 PM    Hello, our patient attached above has had Diabetic Eye Exam completed/performed. Please assist in updating the patient chart by pulling the document from the Media Tab. The date of service is 3/11/25.     Thank you,  Lucien Hargrove MA   [facility] [place]

## 2025-03-19 ENCOUNTER — TELEPHONE (OUTPATIENT)
Dept: ADMINISTRATIVE | Facility: OTHER | Age: 70
End: 2025-03-19

## 2025-03-19 NOTE — TELEPHONE ENCOUNTER
03/19/25 4:12 PM    Patient contacted to bring Advance Directive, POLST, or Living Will document to next scheduled pcp visit.VBI Department spoke with patient/ caregiver.    Thank you.  Jacquie Galvan MA  [facility]

## 2025-03-20 ENCOUNTER — OFFICE VISIT (OUTPATIENT)
Dept: FAMILY MEDICINE CLINIC | Facility: CLINIC | Age: 70
End: 2025-03-20
Payer: COMMERCIAL

## 2025-03-20 VITALS
DIASTOLIC BLOOD PRESSURE: 74 MMHG | HEIGHT: 69 IN | TEMPERATURE: 98.1 F | SYSTOLIC BLOOD PRESSURE: 126 MMHG | HEART RATE: 76 BPM | WEIGHT: 196 LBS | BODY MASS INDEX: 29.03 KG/M2 | OXYGEN SATURATION: 96 %

## 2025-03-20 DIAGNOSIS — Z71.2 ENCOUNTER TO DISCUSS TEST RESULTS: ICD-10-CM

## 2025-03-20 DIAGNOSIS — I10 PRIMARY HYPERTENSION: Primary | ICD-10-CM

## 2025-03-20 DIAGNOSIS — E78.2 MIXED HYPERLIPIDEMIA: ICD-10-CM

## 2025-03-20 DIAGNOSIS — Z79.899 MEDICATION MANAGEMENT: ICD-10-CM

## 2025-03-20 DIAGNOSIS — E11.9 TYPE 2 DIABETES MELLITUS WITHOUT COMPLICATION, WITHOUT LONG-TERM CURRENT USE OF INSULIN (HCC): ICD-10-CM

## 2025-03-20 PROCEDURE — G2211 COMPLEX E/M VISIT ADD ON: HCPCS | Performed by: FAMILY MEDICINE

## 2025-03-20 PROCEDURE — 99214 OFFICE O/P EST MOD 30 MIN: CPT | Performed by: FAMILY MEDICINE

## 2025-03-20 NOTE — ASSESSMENT & PLAN NOTE
Lab Results   Component Value Date    HGBA1C 6.2 (H) 11/20/2024   Recent A1c by the visiting nurse for the insurance company on 2/14/2025 was 4.8%
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
## 2025-03-21 ENCOUNTER — TELEPHONE (OUTPATIENT)
Dept: ADMINISTRATIVE | Facility: OTHER | Age: 70
End: 2025-03-21

## 2025-03-21 NOTE — LETTER
Lab Result(s) Request Form: Hemoglobin A1c       Date Requested: 25  Patient: Bill Alamo  Patient : 1955   Referring Provider: MO Borrego, DO        Date of Lab Collection ______________________________       The above patient has informed us that they have completed their   most recent Hemoglobin A1c at your facility. Please complete   this form and attach all corresponding procedure reports/results.    Comments __________________________________________________________  ____________________________________________________________________  ____________________________________________________________________  ____________________________________________________________________    Collecting/Resulting Facility  ___________________________________________  Form Completed By (print name) ________________________________________    Signature ___________________________________________________________      These reports are needed for  compliance.    Please fax this completed form and a copy of the lab result(s)/ report(s) to the [facility] Based Department as soon as possible via Fax [phone], esthela Vivas: Phone [phone]. Our office is located at [address] [place] [address].    We thank you for your assistance in treating our mutual patient.

## 2025-03-25 NOTE — TELEPHONE ENCOUNTER
----- Message from Lucien KAPLAN sent at 3/20/2025 11:09 AM EDT -----  Regarding: Hb A1C -- 4.8 on 2/19/25 03/20/25 11:09 AM    Hello, our patient attached above has had Hemoglobin A1c completed/performed. Please assist in updating the patient chart by pulling the document from the Media Tab. The date of service is 2/19/25.     Thank you,  Lucien Hargrove MA   [facility] [place]  
Upon review of the In Basket request and the patient's chart, initial outreach has been made via fax to facility. Please see Contacts section for details.   Need Lab Results. Letter sent to [facility]  Thank you  Sangeetha Alonzo MA   
Upon review of the In Basket request we have found as a result of outreach that the patient did not have the requested item(s) completed.  1-30-24 last A1c at [facility].  Media tab states this date as well on the bottom of the form.    Any additional questions or concerns should be emailed to the Practice Liaisons via the appropriate education email address, please do not reply via In Basket.    Thank you  Sangeetha Alonzo MA   [facility]          
Upon review of the In Basket request we were able to locate, review, and update the patient chart as requested for Hemoglobin A1c. 11-20-24 and 1-30-24. No 2025 in the media tab. Letter was sent to [facility] to see if any 2025 labs were done.    Any additional questions or concerns should be emailed to the Practice Liaisons via the appropriate education email address, please do not reply via In Basket.    Thank you  Sangeetha Alonzo MA   [facility]          
Detail Level: Zone
Continue Regimen: Dupixent 300mg SC every 3 weeks\\nPrednisone 10mg every other day

## 2025-04-03 DIAGNOSIS — D12.8 ADENOMATOUS RECTAL POLYP: Primary | ICD-10-CM

## 2025-04-04 ENCOUNTER — TELEPHONE (OUTPATIENT)
Age: 70
End: 2025-04-04

## 2025-04-04 NOTE — TELEPHONE ENCOUNTER
----- Message from Faiza HASSAN sent at 4/3/2025  2:08 PM EDT -----  Regarding: FW: Distal rectal adenoma    ----- Message -----  From: KAMARI Melchor MD  Sent: 4/3/2025   2:04 PM EDT  To: Vipul Lilly MD; #  Subject: RE: Distal rectal adenoma                        Thanks, again.    Office, please schedule within a week, or so.    Pablo  ----- Message -----  From: Vipul Lilly MD  Sent: 4/3/2025  12:47 PM EDT  To: KAMARI Melchor MD; #  Subject: Distal rectal adenoma                            This is one of the patients I mentioned to you as a potential candidate for transanal excision.  Has an approximately 10 mm adenomatous polyp with high-grade dysplasia just above the dentate line.

## 2025-04-04 NOTE — TELEPHONE ENCOUNTER
"Called patient to schedule consult within a week. Patient stated it was \"a bad time\" and requested a call back later. Will attempt to schedule at a later time.   "

## 2025-04-15 ENCOUNTER — OFFICE VISIT (OUTPATIENT)
Age: 70
End: 2025-04-15
Payer: COMMERCIAL

## 2025-04-15 DIAGNOSIS — E11.3293 MILD NONPROLIFERATIVE DIABETIC RETINOPATHY OF BOTH EYES WITHOUT MACULAR EDEMA ASSOCIATED WITH TYPE 2 DIABETES MELLITUS (HCC): Primary | ICD-10-CM

## 2025-04-15 DIAGNOSIS — H25.13 NUCLEAR SCLEROSIS OF BOTH EYES: ICD-10-CM

## 2025-04-15 DIAGNOSIS — H43.823 VITREOMACULAR ADHESION OF BOTH EYES: ICD-10-CM

## 2025-04-15 LAB
LEFT EYE DIABETIC RETINOPATHY: NORMAL
RIGHT EYE DIABETIC RETINOPATHY: NORMAL

## 2025-04-15 PROCEDURE — 99204 OFFICE O/P NEW MOD 45 MIN: CPT | Performed by: OPHTHALMOLOGY

## 2025-04-15 PROCEDURE — 92134 CPTRZ OPH DX IMG PST SGM RTA: CPT | Performed by: OPHTHALMOLOGY

## 2025-04-15 NOTE — PROGRESS NOTES
Name: Bill Alamo      : 1955      MRN: [id number]  Encounter Provider: Ailyn Solorio MD  Encounter Date: 4/15/2025   Encounter department: [facility] OPHTHALMOLOGY  :  Assessment & Plan  Mild nonproliferative diabetic retinopathy of both eyes without macular edema associated with type 2 diabetes mellitus (HCC)    Lab Results   Component Value Date    HGBA1C 6.2 (H) 2024       Orders:    OCT, Retina - OU - Both Eyes    Fundus Photos - OU - Both Eyes  Mild diabetic retinopathy on exam, no macular edema, no NV. The importance of proper blood sugar, blood lipids, and blood pressure control for minimizing the risk of vision loss due to retinopathy associated with diabetes mellitus and hypertension was discussed with the patient. Stressed the importance of following up for monitoring and management by a primary care physician.     Vitreomacular adhesion of both eyes  Pt has syneresis; No retinal tears, breaks, or detachments on dilated examination; Recommend monitoring; Retinal detachment precautions were discussed with the patient. The patient was instructed to call or return immediately for an increase in flashes of light, floaters (dark spots in vision), or curtaining of the visual field.          Nuclear sclerosis of both eyes  Not visually significant at this time. Continue to monitor; Pt denies any difficulty with glare or decrease in vision.                 Bill Alamo is a 69 y.o. male who presents Diabetic exam, Pt is a Dm 2 x 7 yrs, A1C 4.8 mobile unit from [facility], last eye exam x 2 yrs, pt states vision is good, denies floaters  History obtained from: patient    Review of Systems  Medical History Reviewed by provider this encounter:  Tobacco  Allergies  Meds  Problems  Med Hx  Surg Hx  Fam Hx     .     Past Ocular History:none  Ocular Meds/Drops: none    Base Eye Exam       Visual Acuity (Snellen - Linear)         Right Left    Dist sc 20/25 20/20              Tonometry  (Applanation, 2:38 PM)         Right Left    Pressure 17 16              Pupils         Pupils Dark APD    Right PERRL 3 None    Left PERRL 3 None              Visual Fields         Left Right     Full Full              Extraocular Movement         Right Left     Full, Ortho Full, Ortho              Neuro/Psych       Oriented x3: Yes    Mood/Affect: Normal              Dilation       Both eyes: 2.5% Phenylephrine @ 2:40 PM              Dilation #2       Both eyes: 1% Tropicamide @ 2:40 PM                  Slit Lamp and Fundus Exam       External Exam         Right Left    External Normal Normal              Slit Lamp Exam         Right Left    Lids/Lashes Normal Normal    Conjunctiva/Sclera White and quiet White and quiet    Cornea Clear Clear    Anterior Chamber Deep and quiet Deep and quiet    Iris Round and reactive Round and reactive    Lens 1+ Nuclear sclerosis 1+ Nuclear sclerosis    Anterior Vitreous No PVD, clear, no cell No PVD, clear, no cell              Fundus Exam         Right Left    Disc sharp, no pallor sharp, no pallor    C/D Ratio 0.25 0.25    Macula flat/no heme / good foveal reflex flat/no heme / good foveal reflex    Vessels normal in caliber normal in caliber    Periphery flat, no retinal tear or detachment; dot heme flat, no retinal tear or detachment; dot heme                      IMAGING:  OCT, Retina - OU - Both Eyes          Right Eye  Quality was good. Findings include (Vitreomacular adhesion).     Left Eye  Quality was good. Findings include (Vitreomacular adhesion).          Fundus Photos - OU - Both Eyes          Right Eye  Disc findings include normal observations. Macula findings include normal observations. Vessel findings include normal observations. Periphery findings include hemorrhage.     Left Eye  Disc findings include normal observations. Macula findings include normal observations. Vessel findings include normal observations. Periphery findings include hemorrhage.

## 2025-04-22 DIAGNOSIS — E11.9 TYPE 2 DIABETES MELLITUS WITHOUT COMPLICATION, WITHOUT LONG-TERM CURRENT USE OF INSULIN (HCC): ICD-10-CM

## 2025-04-22 DIAGNOSIS — E78.2 MIXED HYPERLIPIDEMIA: ICD-10-CM

## 2025-04-22 RX ORDER — ATORVASTATIN CALCIUM 10 MG/1
10 TABLET, FILM COATED ORAL
Qty: 30 TABLET | Refills: 0 | Status: SHIPPED | OUTPATIENT
Start: 2025-04-22

## 2025-04-25 DIAGNOSIS — I10 PRIMARY HYPERTENSION: ICD-10-CM

## 2025-04-25 RX ORDER — LISINOPRIL 5 MG/1
5 TABLET ORAL DAILY
Qty: 90 TABLET | Refills: 1 | Status: SHIPPED | OUTPATIENT
Start: 2025-04-25

## 2025-04-30 DIAGNOSIS — K21.9 GASTROESOPHAGEAL REFLUX DISEASE WITHOUT ESOPHAGITIS: Primary | ICD-10-CM

## 2025-04-30 RX ORDER — PANTOPRAZOLE SODIUM 40 MG/1
40 TABLET, DELAYED RELEASE ORAL DAILY
Qty: 30 TABLET | Refills: 1 | Status: SHIPPED | OUTPATIENT
Start: 2025-04-30

## 2025-05-06 NOTE — PROGRESS NOTES
Colon and Rectal Surgery   Bill Alamo 70 y.o. male MRN [id number]  Encounter: [id number]  05/07/25 3:24 PM            Assessment: Bill Alamo is a 70 y.o. male who has a rectal polyp.      Plan:   Rectal polyp  Colonoscopy showed a rectal polyp.  Recommendations for follow-up in 10 years were made.  The pathology reveals an adenoma.  I recommend a follow-up colonoscopy in 5 years.  This is ordered.    The patient has a right anterior 1.5 to 2 cm nodular polyp at the anorectal junction extending into the anal transitional zone.  This can be removed easily through the anus.  Surgical excision is recommended given the position of the polyp and the lack of ability to get a reliable purchase around it for removal with colonoscopic techniques.  I viewed the photos that show this polyp clearly.  The findings at colonoscopy match my findings today.    Risks of polyp recurrence, bleeding, pain, or anorectal dysfunction were reviewed.  The patient agrees to transanal excision of the rectal polyp.      Subjective     HPI    Bill Alamo is a 70 y.o. male who is referred today by Dr. Vipul Lilly for adenomatous rectal polyp.      The patient notes intermittent abdominal pain.     Last colonoscopy performed on 1/30/2025 by Dr. Lilly, showed: Indication: Positive Cologuard.  Impression: 10 mm polyp in the anal region; performed cold forceps biopsy with partial removal.     Colonoscopy pathology:  A. Rectum, polypectomy:  - At least high grade dysplasia arising in fragments of tubular adenoma  ~Comment:  A higher grade lesion / deeper invasion cannot be excluded due to the superficial nature of the biopsy.  Clinical and endoscopic correlation is recommended.     Historical Information   Past Medical History:   Diagnosis Date    Diabetes mellitus (HCC)     DM 2 -- originally 10.6 when Dx    Erectile dysfunction     Hx of hepatitis C     Hypertension     Hypertension     Substance abuse (HCC)      No past surgical history  "on file.    Meds/Allergies       Current Outpatient Medications:     atorvastatin (LIPITOR) 10 mg tablet, TAKE 1 TABLET BY MOUTH DAILY AT BEDTIME, Disp: 30 tablet, Rfl: 0    buprenorphine-naloxone (SUBOXONE) 8-2 mg per SL tablet, Place 1 tablet under the tongue 2 (two) times a day, Disp: , Rfl:     lisinopril (ZESTRIL) 5 mg tablet, TAKE 1 TABLET (5 MG TOTAL) BY MOUTH DAILY., Disp: 90 tablet, Rfl: 1    metFORMIN (GLUCOPHAGE) 1000 MG tablet, Take 1 tablet (1,000 mg total) by mouth 2 (two) times a day with meals, Disp: 100 tablet, Rfl: 6    metoprolol succinate (TOPROL-XL) 25 mg 24 hr tablet, Take 1 tablet (25 mg total) by mouth daily, Disp: 90 tablet, Rfl: 3    NON FORMULARY, Take by mouth in the morning Gluco Gold for Blood sugar, Disp: , Rfl:     pantoprazole (PROTONIX) 40 mg tablet, Take 1 tablet (40 mg total) by mouth daily 1/2 hour before breakfast, Disp: 30 tablet, Rfl: 1  No Known Allergies    Social History   Social History     Substance and Sexual Activity   Drug Use Not Currently    Types: Oxycodone    Comment: [id number]     Social History     Tobacco Use   Smoking Status Never   Smokeless Tobacco Never         Family History   Problem Relation Age of Onset    Coronary artery disease Mother     Alcohol abuse Father     Heart failure Father          Review of Systems   Constitutional: Negative.    Respiratory: Negative.     Cardiovascular: Negative.    Gastrointestinal: Negative.        Objective   Current Vitals:  Vitals:    05/07/25 1501   Weight: 90.3 kg (199 lb)   Height: 5' 9\" (1.753 m)         Physical Exam  Constitutional:       Appearance: Normal appearance.   Cardiovascular:      Rate and Rhythm: Normal rate and regular rhythm.   Pulmonary:      Effort: Pulmonary effort is normal.      Breath sounds: Normal breath sounds.   Abdominal:      General: Abdomen is flat.      Palpations: Abdomen is soft.   Genitourinary:     Comments: Right anterior nodular polyp is palpated at the proximal anal canal " extending into the anal transitional zone.  This is 1.5-2 cm in diameter.  Neurological:      General: No focal deficit present.      Mental Status: He is alert and oriented to person, place, and time.

## 2025-05-07 ENCOUNTER — TELEPHONE (OUTPATIENT)
Age: 70
End: 2025-05-07

## 2025-05-07 ENCOUNTER — OFFICE VISIT (OUTPATIENT)
Age: 70
End: 2025-05-07
Payer: COMMERCIAL

## 2025-05-07 VITALS — BODY MASS INDEX: 29.47 KG/M2 | HEIGHT: 69 IN | WEIGHT: 199 LBS

## 2025-05-07 DIAGNOSIS — K62.1 RECTAL POLYP: Primary | ICD-10-CM

## 2025-05-07 PROCEDURE — 99204 OFFICE O/P NEW MOD 45 MIN: CPT | Performed by: COLON & RECTAL SURGERY

## 2025-05-07 RX ORDER — SODIUM CHLORIDE, SODIUM LACTATE, POTASSIUM CHLORIDE, CALCIUM CHLORIDE 600; 310; 30; 20 MG/100ML; MG/100ML; MG/100ML; MG/100ML
20 INJECTION, SOLUTION INTRAVENOUS CONTINUOUS
OUTPATIENT
Start: 2025-05-07

## 2025-05-07 NOTE — LETTER
Bill Alamo  [address] [place] [facility] [address]        5/7/2025    Dear Bill Alamo,    Your surgery is scheduled for: 6/6/2025  The hospital will call the evening prior to your surgery with your expected arrival time.   Location:[facility] [address] [facility] [address]    CHECK LIST PRIOR TO OUTPATIENT SURGERY  It is your responsibility to obtain any/all referrals needed for your surgery if required by your insurance. Our office will contact you to discuss your insurance coverage for this procedure.     Special instructions required if you are taking any blood thinners. Please verify with the prescribing physician. Examples include Coumadin, Plavix, Xarelto, Eliquis, Pradaxa, etc.     Please check with your family physician if you are taking the following medications: Aspirin or any Aspirin containing medication, Gingko biloba, Ginseng, Feverfew, and/or Cherelle’s Wort. We suggest stopping these for 3 days.     The night before and the day of your surgery, wash from your neck to groin with chlorhexidine soap. This soap is available at most retail pharmacies under such brand names as Hibiclens, Endure or Aplicare.     Pre-admission testing Required: YES      NO x                             Other Clearances/ Additional Testing: NONE     NOTHING TO EAT OR DRINK AFTER MIDNIGHT PRIOR TO SURGERY.     Take ONE FLEET ENEMA one hour prior to leaving for the hospital.     Please do not hesitate to call our office with any questions regarding your surgery.                    Medicine Instructions for Adults with Diabetes (NO Bowel Prep)      Follow these instructions when a BOWEL PREP is NOT required for your procedure or surgery!    NOTE:  GLP-1 Agonists taken weekly: do not take in the 7 days before your procedure  SGLT-2 Inhibitors: do not take in the 4 days before your procedure    On the Day Before Surgery/Procedure  If you are having a procedure (e.g. Colonoscopy) or surgery  which DOES NOT require a bowel prep, follow the directions below based on the type of medicine you take for your diabetes.    Type of Medicine You Take Examples What to Do   Pre-Mixed Insulin - Intermediate Acting Humalog® 75/25, Humulin® 70/30, Novolog® 70/30, Regular Insulin Take ½ your regular dose the evening before your procedure.   Rapid/Fast Acting Insulin/Long-Acting Insulin Humalog® U200, NovoLog®, Apidra®, Lantus®, Levemir®, Tresiba®, Toujeo®, Fiasp®, Basaglar® Take your FULL regular dose the day before procedure.   Oral Diabetic Medicines (sulfonylurea) Glipizide/Glimepiride/Glucotrol® Take your regular dose with dinner the evening before your procedure.   Other Oral Diabetic Medicines   Metformin®, Glucophage®, Glucophage XR®, Riomet®, Glumetza®), Actose®, Avandia®, Glyset®, Prandin® Take your regular dose with dinner the evening before your procedure   GLP-1 Agonists Adlyxin®, Byetta®, Bydureon®, Ozempic®, Soliqua®, Tanzeum®, Trulicity®, Victoza®, Saxenda®, Rybelsus® If taken daily, take as normal    If taken weekly, do not take this medicine for 7 days before your procedure including the day of the procedure (resume taking after the procedure)   SGLT-2 Inhibitors Jardiance®, Invokana®, Farxiga®,   Steglatro®, Brenzavvy®, Qtern®, Segluromet®, Glyxambi®, Synjardy®, Synjardy XR®, Invokamet®, Invokamet XR®, Trijary XR®, Xigduo XR®, Steglujan® Do not take for 4 days before your procedure including the day of the procedure (resume taking after the procedure)                 More information continued on back                  Medicine Instructions for Adults with Diabetes (No Bowel Prep)   Page 2      On the Day of Surgery/Procedure  Follow the directions below based on the type of medicine you take for your diabetes.    Type of Medicine You Take Examples What to Do   Long-Acting Insulin Lantus®, Levemir®, Tresiba®, Toujeo®, Basaglar®, Semglee® If you normally take your Long-Acting Insulin in the morning,  take the full dose as scheduled.   GLP-1 Agonists AdlyxinÒ, ByettaÒ, BydureonÒ, OzempicÒ, SoliquaÒ, TanzeumÒ, TrulicityÒ, VictozaÒ, Saxenda®, Rybelsus® Do NOT take this medicine on the day of your procedure (resume taking after the procedure)       On the Day of Surgery/Procedure (continued)  Except for the morning Long-Acting Insulin, DO NOT take ANY diabetic medicine on the day of your procedure unless you were instructed by the doctor who manages your diabetes medicines.    Continue to check your blood sugars.  If you have an insulin pump, ask your endocrinologist for instructions at least 3 days before your procedure. NOTE: If you are not able to ask your endocrinologist in advance, on the day of the procedure set your insulin pump to your basal rate only. Bring your insulin pump supplies to the hospital.     If you have any questions about taking your diabetes medicines prior to your procedure, please contact the doctor who manages your diabetes medicines.

## 2025-05-07 NOTE — TELEPHONE ENCOUNTER
Patient scheduled for surgery  6/6/2025  at [facility]. Instructions and PAT's gone over with and handed to the patient.   Post Op    ----- Message from KAMARI Melchor MD sent at 5/7/2025  3:24 PM EDT -----  OR

## 2025-05-07 NOTE — ASSESSMENT & PLAN NOTE
Colonoscopy showed a rectal polyp.  Recommendations for follow-up in 10 years were made.  The pathology reveals an adenoma.  I recommend a follow-up colonoscopy in 5 years.  This is ordered.    The patient has a right anterior 1.5 to 2 cm nodular polyp at the anorectal junction extending into the anal transitional zone.  This can be removed easily through the anus.  Surgical excision is recommended given the position of the polyp and the lack of ability to get a reliable purchase around it for removal with colonoscopic techniques.  I viewed the photos that show this polyp clearly.  The findings at colonoscopy match my findings today.    Risks of polyp recurrence, bleeding, pain, or anorectal dysfunction were reviewed.  The patient agrees to transanal excision of the rectal polyp.

## 2025-05-19 DIAGNOSIS — E11.9 TYPE 2 DIABETES MELLITUS WITHOUT COMPLICATION, WITHOUT LONG-TERM CURRENT USE OF INSULIN (HCC): ICD-10-CM

## 2025-05-19 DIAGNOSIS — E78.2 MIXED HYPERLIPIDEMIA: ICD-10-CM

## 2025-05-20 RX ORDER — ATORVASTATIN CALCIUM 10 MG/1
10 TABLET, FILM COATED ORAL
Qty: 100 TABLET | Refills: 1 | Status: SHIPPED | OUTPATIENT
Start: 2025-05-20

## 2025-05-22 DIAGNOSIS — K21.9 GASTROESOPHAGEAL REFLUX DISEASE WITHOUT ESOPHAGITIS: ICD-10-CM

## 2025-05-22 RX ORDER — PANTOPRAZOLE SODIUM 40 MG/1
TABLET, DELAYED RELEASE ORAL
Qty: 90 TABLET | Refills: 1 | Status: SHIPPED | OUTPATIENT
Start: 2025-05-22

## 2025-05-23 ENCOUNTER — ANESTHESIA EVENT (OUTPATIENT)
Dept: PERIOP | Facility: HOSPITAL | Age: 70
End: 2025-05-23
Payer: COMMERCIAL

## 2025-05-28 ENCOUNTER — TELEPHONE (OUTPATIENT)
Age: 70
End: 2025-05-28

## 2025-05-28 RX ORDER — OMEGA-3S/DHA/EPA/FISH OIL/D3 300MG-1000
400 CAPSULE ORAL DAILY
COMMUNITY

## 2025-05-28 RX ORDER — DIPHENOXYLATE HYDROCHLORIDE AND ATROPINE SULFATE 2.5; .025 MG/1; MG/1
1 TABLET ORAL DAILY
COMMUNITY

## 2025-05-28 NOTE — TELEPHONE ENCOUNTER
Received message from PATs that patient stated he does not have a ride after surgery. LVM advising patient he must have someone pick him up.

## 2025-05-28 NOTE — PRE-PROCEDURE INSTRUCTIONS
Pre-Surgery Instructions:   Medication Instructions    atorvastatin (LIPITOR) 10 mg tablet Take night before surgery    buprenorphine-naloxone (SUBOXONE) 8-2 mg per SL tablet Take day of surgery.    cholecalciferol (VITAMIN D3) 400 units tablet Stop taking 7 days prior to surgery.    lisinopril (ZESTRIL) 5 mg tablet Hold day of surgery.    metFORMIN (GLUCOPHAGE) 1000 MG tablet Hold day of surgery.    metoprolol succinate (TOPROL-XL) 25 mg 24 hr tablet Take day of surgery.    multivitamin (THERAGRAN) TABS Stop taking 7 days prior to surgery.    NON FORMULARY Stop taking 7 days prior to surgery.    pantoprazole (PROTONIX) 40 mg tablet Uses PRN- OK to take day of surgery      Medication instructions for day of surgery reviewed. Please take all instructed medications with only a sip of water.       You will receive a call one business day prior to surgery with an arrival time and hospital directions. If your surgery is scheduled on a Monday, the hospital will be calling you on the Friday prior to your surgery. If you have not heard from anyone by 8pm, please call the hospital supervisor through the hospital  at [phone]. ([place] [phone] or [place] [phone]).    Do not eat or drink anything after midnight the night before your surgery, including candy, mints, lifesavers, or chewing gum. Do not drink alcohol 24hrs before your surgery. Try not to smoke at least 24hrs before your surgery.       Follow the pre surgery showering instructions as listed in the “My Surgical Experience Booklet” or otherwise provided by your surgeon's office. Do not use a blade to shave the surgical area 1 week before surgery. It is okay to use a clean electric clippers up to 24 hours before surgery. Do not apply any lotions, creams, including makeup, cologne, deodorant, or perfumes after showering on the day of your surgery. Do not use dry shampoo, hair spray, hair gel, or any type of hair products.     No contact lenses,  eye make-up, or artificial eyelashes. Remove nail polish, including gel polish, and any artificial, gel, or acrylic nails if possible. Remove all jewelry including rings and body piercing jewelry.     Wear causal clothing that is easy to take on and off. Consider your type of surgery.    Keep any valuables, jewelry, piercings at home. Please bring any specially ordered equipment (sling, braces) if indicated.    Arrange for a responsible person to drive you to and from the hospital on the day of your surgery. Please confirm the visitor policy for the day of your procedure when you receive your phone call with an arrival time.     Call the surgeon's office with any new illnesses, exposures, or additional questions prior to surgery.    Please reference your “My Surgical Experience Booklet” for additional information to prepare for your upcoming surgery.

## 2025-05-28 NOTE — TELEPHONE ENCOUNTER
Pt calling to say he has no one to give him a ride after surgery. Pt said he will need to cancel if there is not another option. Please reach back out to the pt to discuss and cancel if necessary.

## 2025-06-06 ENCOUNTER — TELEPHONE (OUTPATIENT)
Dept: ADMINISTRATIVE | Facility: OTHER | Age: 70
End: 2025-06-06

## 2025-06-06 ENCOUNTER — ANESTHESIA (OUTPATIENT)
Dept: PERIOP | Facility: HOSPITAL | Age: 70
End: 2025-06-06
Payer: COMMERCIAL

## 2025-06-06 NOTE — TELEPHONE ENCOUNTER
Upon review of the In Basket request we were able to identify that the patient had the requested item(s) completed internally and we are unable to update. Please send the example to the appropriate education email address for assistance.     Any additional questions or concerns should be emailed to the Practice Liaisons via the appropriate education email address, please do not reply via In Basket.    Thank you  Stu Berry MA   [facility]

## 2025-06-06 NOTE — TELEPHONE ENCOUNTER
----- Message from Lucien KAPLAN sent at 6/5/2025 12:43 PM EDT -----  Regarding: DM EYE  06/05/25 12:43 PMHello, our patient attached above has had Diabetic Eye Exam completed/performed. Please assist in updating the patient chart by pulling the document from Encounters Tab within Chart Review. The date of service is 4/15/2025. Thank you,Lucien Hargrove [facility]

## 2025-06-12 ENCOUNTER — VBI (OUTPATIENT)
Dept: ADMINISTRATIVE | Facility: OTHER | Age: 70
End: 2025-06-12

## 2025-06-12 NOTE — TELEPHONE ENCOUNTER
Upon review of the In Basket request we were able to locate, review, and update the patient chart as requested for Diabetic Eye Exam.    Any additional questions or concerns should be emailed to the Practice Liaisons via the appropriate education email address, please do not reply via In Basket.    Thank you  Anahy Joiner MA   [facility]

## 2025-07-14 PROBLEM — I10 HTN (HYPERTENSION): Status: ACTIVE | Noted: 2025-07-14

## 2025-07-14 PROBLEM — K21.9 GASTROESOPHAGEAL REFLUX DISEASE: Status: ACTIVE | Noted: 2025-07-14

## 2025-07-14 PROBLEM — E78.5 HYPERLIPIDEMIA: Status: ACTIVE | Noted: 2025-07-14

## 2025-07-14 NOTE — PRE-PROCEDURE INSTRUCTIONS
Pre-Surgery Instructions:   Medication Instructions    atorvastatin (LIPITOR) 10 mg tablet Take day of surgery.    buprenorphine-naloxone (SUBOXONE) 8-2 mg per SL tablet Take day of surgery.    cholecalciferol (VITAMIN D3) 400 units tablet Stop taking 7 days prior to surgery.ld7/14/25    lisinopril (ZESTRIL) 5 mg tablet Hold day of surgery.    metFORMIN (GLUCOPHAGE) 1000 MG tablet Hold day of surgery.    metoprolol succinate (TOPROL-XL) 25 mg 24 hr tablet Take day of surgery.    multivitamin (THERAGRAN) TABS Stop taking 7 days prior to surgery.ld7/14/25    NON FORMULARY Stop taking 7 days prior to surgery.ld7/14    pantoprazole (PROTONIX) 40 mg tablet Take day of surgery.    Medication instructions for day of surgery reviewed. Patient verbalized understanding and agrees with the plan.  Please take all instructed medications with only a sip of water. Please do not take any over the counter (non-prescribed) vitamins or supplements for one week prior to date of surgery.      You will receive a call one business day prior to surgery with an arrival time and hospital directions. If your surgery is scheduled on a Monday, the hospital will be calling you on the Friday prior to your surgery. If you have not heard from anyone by 8pm, please call the hospital supervisor through the hospital  at [phone]. ([place] [phone] or [place] [phone]).    Do not eat or drink anything after midnight the night before your surgery, including candy, mints, lifesavers, or chewing gum. Do not drink alcohol 24hrs before your surgery. Try not to smoke at least 24hrs before your surgery.       Follow the pre surgery showering instructions as listed in the “My Surgical Experience Booklet” or otherwise provided by your surgeon's office. Do not use a blade to shave the surgical area 1 week before surgery. It is okay to use a clean electric clippers up to 24 hours before surgery. Do not apply any lotions, creams, including  makeup, cologne, deodorant, or perfumes after showering on the day of your surgery. Do not use dry shampoo, hair spray, hair gel, or any type of hair products.     No contact lenses, eye make-up, or artificial eyelashes. Remove nail polish, including gel polish, and any artificial, gel, or acrylic nails if possible. Remove all jewelry including rings and body piercing jewelry.     Wear causal clothing that is easy to take on and off. Consider your type of surgery.    Keep any valuables, jewelry, piercings at home. Please bring any specially ordered equipment (sling, braces) if indicated.    Arrange for a responsible person to drive you to and from the hospital on the day of your surgery. Please confirm the visitor policy for the day of your procedure when you receive your phone call with an arrival time.     Call the surgeon's office with any new illnesses, exposures, or additional questions prior to surgery.    Please reference your “My Surgical Experience Booklet” for additional information to prepare for your upcoming surgery.

## 2025-07-15 ENCOUNTER — HOSPITAL ENCOUNTER (OUTPATIENT)
Facility: HOSPITAL | Age: 70
Setting detail: OUTPATIENT SURGERY
Discharge: HOME | End: 2025-07-15
Attending: COLON & RECTAL SURGERY | Admitting: COLON & RECTAL SURGERY
Payer: COMMERCIAL

## 2025-07-15 VITALS
RESPIRATION RATE: 16 BRPM | BODY MASS INDEX: 28.14 KG/M2 | SYSTOLIC BLOOD PRESSURE: 116 MMHG | HEIGHT: 69 IN | WEIGHT: 190 LBS | HEART RATE: 58 BPM | OXYGEN SATURATION: 97 % | TEMPERATURE: 97 F | DIASTOLIC BLOOD PRESSURE: 78 MMHG

## 2025-07-15 DIAGNOSIS — K62.1 RECTAL POLYP: ICD-10-CM

## 2025-07-15 LAB — GLUCOSE SERPL-MCNC: 153 MG/DL (ref 65–140)

## 2025-07-15 PROCEDURE — 88305 TISSUE EXAM BY PATHOLOGIST: CPT | Performed by: PATHOLOGY

## 2025-07-15 PROCEDURE — NC001 PR NO CHARGE: Performed by: COLON & RECTAL SURGERY

## 2025-07-15 PROCEDURE — 82948 REAGENT STRIP/BLOOD GLUCOSE: CPT

## 2025-07-15 PROCEDURE — 45171 EXC RECT TUM TRANSANAL PART: CPT | Performed by: COLON & RECTAL SURGERY

## 2025-07-15 RX ORDER — FENTANYL CITRATE 50 UG/ML
INJECTION, SOLUTION INTRAMUSCULAR; INTRAVENOUS AS NEEDED
Status: DISCONTINUED | OUTPATIENT
Start: 2025-07-15 | End: 2025-07-15

## 2025-07-15 RX ORDER — ONDANSETRON 2 MG/ML
4 INJECTION INTRAMUSCULAR; INTRAVENOUS ONCE AS NEEDED
Status: DISCONTINUED | OUTPATIENT
Start: 2025-07-15 | End: 2025-07-15 | Stop reason: HOSPADM

## 2025-07-15 RX ORDER — PROPOFOL 10 MG/ML
INJECTION, EMULSION INTRAVENOUS CONTINUOUS PRN
Status: DISCONTINUED | OUTPATIENT
Start: 2025-07-15 | End: 2025-07-15

## 2025-07-15 RX ORDER — LIDOCAINE HYDROCHLORIDE 10 MG/ML
0.5 INJECTION, SOLUTION EPIDURAL; INFILTRATION; INTRACAUDAL; PERINEURAL ONCE AS NEEDED
Status: DISCONTINUED | OUTPATIENT
Start: 2025-07-15 | End: 2025-07-15 | Stop reason: HOSPADM

## 2025-07-15 RX ORDER — ACETAMINOPHEN 325 MG/1
975 TABLET ORAL ONCE
Status: COMPLETED | OUTPATIENT
Start: 2025-07-15 | End: 2025-07-15

## 2025-07-15 RX ORDER — SODIUM CHLORIDE, SODIUM LACTATE, POTASSIUM CHLORIDE, CALCIUM CHLORIDE 600; 310; 30; 20 MG/100ML; MG/100ML; MG/100ML; MG/100ML
100 INJECTION, SOLUTION INTRAVENOUS CONTINUOUS
Status: DISCONTINUED | OUTPATIENT
Start: 2025-07-15 | End: 2025-07-15 | Stop reason: HOSPADM

## 2025-07-15 RX ORDER — FENTANYL CITRATE/PF 50 MCG/ML
25 SYRINGE (ML) INJECTION
Status: DISCONTINUED | OUTPATIENT
Start: 2025-07-15 | End: 2025-07-15 | Stop reason: HOSPADM

## 2025-07-15 RX ORDER — SODIUM CHLORIDE, SODIUM LACTATE, POTASSIUM CHLORIDE, CALCIUM CHLORIDE 600; 310; 30; 20 MG/100ML; MG/100ML; MG/100ML; MG/100ML
20 INJECTION, SOLUTION INTRAVENOUS CONTINUOUS
Status: DISCONTINUED | OUTPATIENT
Start: 2025-07-15 | End: 2025-07-15 | Stop reason: HOSPADM

## 2025-07-15 RX ORDER — EPHEDRINE SULFATE 50 MG/ML
INJECTION INTRAVENOUS AS NEEDED
Status: DISCONTINUED | OUTPATIENT
Start: 2025-07-15 | End: 2025-07-15

## 2025-07-15 RX ORDER — PROPOFOL 10 MG/ML
INJECTION, EMULSION INTRAVENOUS AS NEEDED
Status: DISCONTINUED | OUTPATIENT
Start: 2025-07-15 | End: 2025-07-15

## 2025-07-15 RX ORDER — BUPIVACAINE HYDROCHLORIDE AND EPINEPHRINE 2.5; 5 MG/ML; UG/ML
INJECTION, SOLUTION EPIDURAL; INFILTRATION; INTRACAUDAL; PERINEURAL AS NEEDED
Status: DISCONTINUED | OUTPATIENT
Start: 2025-07-15 | End: 2025-07-15 | Stop reason: HOSPADM

## 2025-07-15 RX ADMIN — EPHEDRINE SULFATE 10 MG: 50 INJECTION, SOLUTION INTRAVENOUS at 12:33

## 2025-07-15 RX ADMIN — PROPOFOL 100 MCG/KG/MIN: 10 INJECTION, EMULSION INTRAVENOUS at 11:39

## 2025-07-15 RX ADMIN — ACETAMINOPHEN 975 MG: 325 TABLET ORAL at 09:44

## 2025-07-15 RX ADMIN — SODIUM CHLORIDE, SODIUM LACTATE, POTASSIUM CHLORIDE, AND CALCIUM CHLORIDE: .6; .31; .03; .02 INJECTION, SOLUTION INTRAVENOUS at 11:33

## 2025-07-15 RX ADMIN — DEXMEDETOMIDINE HYDROCHLORIDE 12 MCG: 100 INJECTION, SOLUTION INTRAVENOUS at 11:39

## 2025-07-15 RX ADMIN — DEXMEDETOMIDINE HYDROCHLORIDE 8 MCG: 100 INJECTION, SOLUTION INTRAVENOUS at 12:20

## 2025-07-15 RX ADMIN — FENTANYL CITRATE 25 MCG: 50 INJECTION INTRAMUSCULAR; INTRAVENOUS at 12:20

## 2025-07-15 RX ADMIN — SODIUM CHLORIDE, SODIUM LACTATE, POTASSIUM CHLORIDE, AND CALCIUM CHLORIDE 100 ML/HR: .6; .31; .03; .02 INJECTION, SOLUTION INTRAVENOUS at 09:53

## 2025-07-15 RX ADMIN — PROPOFOL 30 MG: 10 INJECTION, EMULSION INTRAVENOUS at 11:40

## 2025-07-15 RX ADMIN — FENTANYL CITRATE 25 MCG: 50 INJECTION INTRAMUSCULAR; INTRAVENOUS at 11:47

## 2025-07-16 ENCOUNTER — NURSE TRIAGE (OUTPATIENT)
Age: 70
End: 2025-07-16

## 2025-07-16 NOTE — TELEPHONE ENCOUNTER
7/15/25 EXCISION  BIOPSY LESION/ MASS  ANAL/RECTAL -Dr. Melchor    Pt calling because he used his shower, like a bidet to rinse rectal area off after a BM.  He then read his post op instructions, which said to keep the area clean and dry.  Concerned because water may have gotten into rectum and also would like to know if he can use shower like a bidet to rinse off after BM's, since more effective than using wipes.    Please advise.

## 2025-07-17 ENCOUNTER — OFFICE VISIT (OUTPATIENT)
Dept: FAMILY MEDICINE CLINIC | Facility: CLINIC | Age: 70
End: 2025-07-17
Payer: COMMERCIAL

## 2025-07-17 VITALS
WEIGHT: 199.6 LBS | HEIGHT: 69 IN | TEMPERATURE: 98.4 F | DIASTOLIC BLOOD PRESSURE: 78 MMHG | BODY MASS INDEX: 29.56 KG/M2 | HEART RATE: 77 BPM | SYSTOLIC BLOOD PRESSURE: 136 MMHG | OXYGEN SATURATION: 96 %

## 2025-07-17 DIAGNOSIS — K62.1 RECTAL POLYP: ICD-10-CM

## 2025-07-17 DIAGNOSIS — K62.1 DYSPLASTIC RECTAL POLYP: ICD-10-CM

## 2025-07-17 DIAGNOSIS — E78.2 MODERATE MIXED HYPERLIPIDEMIA NOT REQUIRING STATIN THERAPY: ICD-10-CM

## 2025-07-17 DIAGNOSIS — I10 PRIMARY HYPERTENSION: ICD-10-CM

## 2025-07-17 DIAGNOSIS — E11.9 TYPE 2 DIABETES MELLITUS WITHOUT COMPLICATION, WITHOUT LONG-TERM CURRENT USE OF INSULIN (HCC): ICD-10-CM

## 2025-07-17 DIAGNOSIS — R63.5 WEIGHT GAIN: Primary | ICD-10-CM

## 2025-07-17 PROCEDURE — G2211 COMPLEX E/M VISIT ADD ON: HCPCS | Performed by: FAMILY MEDICINE

## 2025-07-17 PROCEDURE — 99214 OFFICE O/P EST MOD 30 MIN: CPT | Performed by: FAMILY MEDICINE

## 2025-07-17 NOTE — ASSESSMENT & PLAN NOTE
Lab Results   Component Value Date    HGBA1C 6.2 (H) 11/20/2024    we talked about the GLP-1 medication he is going to look into it all the different types etc. continues on metformin 1000 mg twice daily wants to return in 6 mo and be re-evaluated.  We discussed beginning Mounjaro but he is concerned about the deep cost and whether he would qualify I ask him to look it up measured the pros and cons and that would be an excellent way of controlling his blood sugar as well as his weight

## 2025-07-17 NOTE — TELEPHONE ENCOUNTER
Spoke to pt aware he needs to keep the area clean and dry and he can use the bidet if it is easier for him

## 2025-07-17 NOTE — PROGRESS NOTES
Name: Bill Alamo      : 1955      MRN: [id number]  Encounter Provider: MO Borrego DO  Encounter Date: 2025   Encounter department: [facility] [place]    :  Assessment & Plan  Weight gain  Weight up 15 pounds in 1 year was 184 1 year ago today 199 does not know why he gained weight??  He is diabetic we talked about the GLP-1 medication he is going to look into it all the different types etc. continues on metformin 1000 mg twice daily wants to return in 6 mo and be re-evaluated.  We discussed beginning Mounjaro but he is concerned about the deep cost and whether he would qualify I ask him to look it up measured the pros and cons and that would be an excellent way of controlling his blood sugar as well as his weight       Type 2 diabetes mellitus without complication, without long-term current use of insulin ([facility])    Lab Results   Component Value Date    HGBA1C 6.2 (H) 2024    we talked about the GLP-1 medication he is going to look into it all the different types etc. continues on metformin 1000 mg twice daily wants to return in 6 mo and be re-evaluated.  We discussed beginning Mounjaro but he is concerned about the deep cost and whether he would qualify I ask him to look it up measured the pros and cons and that would be an excellent way of controlling his blood sugar as well as his weight            Moderate mixed hyperlipidemia not requiring statin therapy    Orders:  •  Lipoprotein A (LPA); Future    Primary hypertension  Blood pressure 136/78       Dysplastic rectal polyp  Patient had dysplastic rectal polyp excised 48 hours ago by Dr. Palomo at about 1000 mL.  He is quite sore today and not desirous of sitting.  He notes he is improving daily however.  I reviewed the pathology report with him from colonoscopy and biopsy retrieval from January.  States he was not called about this until about 2 months later and now ultimately had the surgery.  He wondered about that.          Assessment & Plan  1. Postoperative pain: Acute.  - Underwent surgery 2 days ago to remove a 2 cm polyp at the anal rectal junction. The polyp was adenomatous with high-grade dysplasia, indicating it was on the verge of becoming malignant.  - Advised to take Aleve 220 mg, 2 tablets twice daily, along with Tylenol for pain relief.  - Hot Sitz baths and Tucks are recommended for comfort.  - MiraLAX 17 g (1 capful) is suggested to maintain bowel regularity.    2. Weight gain.  - Gained 35 pounds over the past 2 years, with a current weight of 199 pounds.  - Advised to increase physical activity and reduce food intake.  - Consultation with a dietitian is recommended.  - Encouraged to consider joining a medical weight loss program or [employer] for structured support.    3. Diabetes mellitus.  - A1c was 4.8 in 02/2025, indicating good control.  - Currently taking metformin 1000 mg once daily and using GlucoGel, an herbal supplement.  - Advised to continue current medication regimen.  - Mounjaro suggested as an alternative for diabetes management and weight loss, pending insurance approval.    4. Suspected hypothyroidism.  - Reports symptoms of weight gain and frequent sleepiness, raising concerns about hypothyroidism.  - Thyroid function test will be conducted to evaluate thyroid status.    Follow-up  - Thyroid function test  - Blood tests including CMP, A1c, CBC, thyroid, lipid, urine, and vitamin D        Depression Screening and Follow-up Plan: Patient was screened for depression during today's encounter. They screened negative with a PHQ-2 score of 0.          History of Present Illness     History of Present Illness  The patient is a 70-year-old male who presents for evaluation of postoperative pain, weight gain, and diabetes.    Postoperative Pain  - He underwent surgery 2 days ago to remove a 2 cm polyp at the anal rectal junction, performed by Dr. Palomo.  - The polyp was discovered during a colonoscopy  on 01/31/2025, but he was not informed until mid-March 2025 that it should be removed.  - He had to cancel his initial appointment due to transportation issues.  - This was his first colonoscopy.  - He experienced severe pain 2 nights ago, which has since improved and is now manageable.  - He has had 4 bowel movements in the past 2 days.  - He has been using Aleve 220 mg every 8 hours for pain management.    Weight Gain  - He reports a weight gain of 35 pounds over the past 2 years, with no changes in his lifestyle or diet that could explain this.  - He does not consume alcohol and has been walking daily for the past few days.  - He often feels hungry at night and tends to eat yogurt before bed.    Diabetes  - He is diabetic and is currently taking metformin 1000 mg once daily and GlucoGel, an herbal supplement.  - His A1c was 4.8 on 02/28/2025.  - His fingerstick blood sugar was 153 on 07/15/2025, which he attributes to not taking his medication and having coffee with sugar the previous night.  - His A1c was 10.6 when he was diagnosed on 08/30/2018.  - He was initially prescribed metformin and Jardiance, but switched to Farxiga due to insurance issues.    He is also experiencing frequent sleepiness and suspects he may have hypothyroidism. His mother had Hashimoto's disease.    Diet: He often feels hungry at night and tends to eat yogurt before bed.  Alcohol: He does not consume alcohol.  Coffee/Tea/Caffeine-containing Drinks: He had coffee with sugar the night before his fingerstick blood sugar test on 07/15/2025.  Sleep: He is experiencing frequent sleepiness.    PAST SURGICAL HISTORY:  - Polyp removal at the anal rectal junction on 07/15/2025    FAMILY HISTORY  His mother had Hashimoto's disease.     Review of Systems   Constitutional:  Negative for activity change, appetite change, chills, diaphoresis, fatigue, fever and unexpected weight change.   HENT:  Negative for congestion, dental problem, drooling, ear  "discharge, ear pain, facial swelling, mouth sores, nosebleeds, postnasal drip, rhinorrhea, trouble swallowing and voice change.    Eyes:  Negative for photophobia, pain, discharge, redness, itching and visual disturbance.   Respiratory:  Negative for apnea, cough, choking, chest tightness and shortness of breath.    Cardiovascular:  Negative for chest pain and leg swelling.   Gastrointestinal:  Negative for abdominal distention, abdominal pain, constipation, diarrhea and nausea.        Had polyp removed 2 days ago. / [facility] did in [place]. Colonosocpy was in [place] by Dr. Lilly, but not aware that polyp needed to be removed. Was adamatous   Endocrine: Negative for polydipsia, polyphagia and polyuria.   Genitourinary:  Negative for decreased urine volume, difficulty urinating, dysuria, enuresis and hematuria.   Musculoskeletal:  Negative for arthralgias, back pain, gait problem and joint swelling.   Skin:  Negative for color change, pallor, rash and wound.   Allergic/Immunologic: Negative for immunocompromised state.   Neurological:  Negative for dizziness, seizures, syncope, facial asymmetry, speech difficulty, light-headedness and headaches.   Hematological:  Negative for adenopathy.   Psychiatric/Behavioral:  Negative for agitation, behavioral problems, confusion and decreased concentration.      Objective   /78 (BP Location: Left arm, Patient Position: Sitting, Cuff Size: Standard)   Pulse 77   Temp 98.4 °F (36.9 °C) (Temporal)   Ht 5' 9\" (1.753 m)   Wt 90.5 kg (199 lb 9.6 oz)   SpO2 96%   BMI 29.48 kg/m²     Physical Exam  Rectal: Tenderness noted at the anal rectal junction post-polypectomy.  Physical Exam  Vitals and nursing note reviewed.   Constitutional:       General: He is not in acute distress.     Appearance: Normal appearance. He is not ill-appearing, toxic-appearing or diaphoretic.   HENT:      Head: Normocephalic and atraumatic.      Nose: Nose normal. No congestion or rhinorrhea.      " Mouth/Throat:      Mouth: Mucous membranes are moist.     Eyes:      General: No scleral icterus.     Extraocular Movements: Extraocular movements intact.      Pupils: Pupils are equal, round, and reactive to light.       Cardiovascular:      Rate and Rhythm: Normal rate and regular rhythm.   Pulmonary:      Effort: Pulmonary effort is normal.      Breath sounds: Normal breath sounds.   Abdominal:      General: Abdomen is flat.     Musculoskeletal:      Right lower leg: No edema.      Left lower leg: No edema.     Skin:     General: Skin is warm and dry.     Neurological:      General: No focal deficit present.      Mental Status: He is alert and oriented to person, place, and time. Mental status is at baseline.     Psychiatric:         Mood and Affect: Mood normal.         Behavior: Behavior normal.         Thought Content: Thought content normal.         Judgment: Judgment normal.       Administrative Statements   I have spent a total time of 29 minutes in caring for this patient on the day of the visit/encounter including Diagnostic results, Prognosis, Risks and benefits of tx options, Instructions for management, Patient and family education, Importance of tx compliance, Risk factor reductions, Impressions, Counseling / Coordination of care, Documenting in the medical record, Reviewing/placing orders in the medical record (including tests, medications, and/or procedures), and Obtaining or reviewing history  .

## 2025-07-21 PROCEDURE — 88305 TISSUE EXAM BY PATHOLOGIST: CPT | Performed by: PATHOLOGY

## 2025-08-14 ENCOUNTER — OFFICE VISIT (OUTPATIENT)
Age: 70
End: 2025-08-14

## (undated) DEVICE — Device

## (undated) DEVICE — SUT VICRYL PLUS 0 UR-6 27IN VCP603H

## (undated) DEVICE — PACK PBDS GENERAL MINOR RF